# Patient Record
Sex: FEMALE | Race: WHITE | NOT HISPANIC OR LATINO | Employment: FULL TIME | URBAN - METROPOLITAN AREA
[De-identification: names, ages, dates, MRNs, and addresses within clinical notes are randomized per-mention and may not be internally consistent; named-entity substitution may affect disease eponyms.]

---

## 2017-11-29 ENCOUNTER — OFFICE VISIT (OUTPATIENT)
Dept: OBGYN | Facility: CLINIC | Age: 57
End: 2017-11-29
Attending: OBSTETRICS & GYNECOLOGY
Payer: COMMERCIAL

## 2017-11-29 ENCOUNTER — RADIANT APPOINTMENT (OUTPATIENT)
Dept: MAMMOGRAPHY | Facility: CLINIC | Age: 57
End: 2017-11-29
Attending: OBSTETRICS & GYNECOLOGY
Payer: COMMERCIAL

## 2017-11-29 VITALS
HEIGHT: 70 IN | WEIGHT: 197 LBS | BODY MASS INDEX: 28.2 KG/M2 | DIASTOLIC BLOOD PRESSURE: 78 MMHG | SYSTOLIC BLOOD PRESSURE: 124 MMHG

## 2017-11-29 DIAGNOSIS — Z13.6 SCREENING FOR CARDIOVASCULAR CONDITION: ICD-10-CM

## 2017-11-29 DIAGNOSIS — R60.0 LOCALIZED EDEMA: ICD-10-CM

## 2017-11-29 DIAGNOSIS — Z12.31 VISIT FOR SCREENING MAMMOGRAM: ICD-10-CM

## 2017-11-29 DIAGNOSIS — Z01.419 ENCOUNTER FOR GYNECOLOGICAL EXAMINATION WITHOUT ABNORMAL FINDING: Primary | ICD-10-CM

## 2017-11-29 LAB
ALBUMIN SERPL-MCNC: 3.9 G/DL (ref 3.4–5)
ALP SERPL-CCNC: 112 U/L (ref 40–150)
ALT SERPL W P-5'-P-CCNC: 31 U/L (ref 0–50)
ANION GAP SERPL CALCULATED.3IONS-SCNC: 6 MMOL/L (ref 3–14)
AST SERPL W P-5'-P-CCNC: 19 U/L (ref 0–45)
BILIRUB SERPL-MCNC: 0.6 MG/DL (ref 0.2–1.3)
BUN SERPL-MCNC: 13 MG/DL (ref 7–30)
CALCIUM SERPL-MCNC: 9.2 MG/DL (ref 8.5–10.1)
CHLORIDE SERPL-SCNC: 104 MMOL/L (ref 94–109)
CHOLEST SERPL-MCNC: 252 MG/DL
CO2 SERPL-SCNC: 30 MMOL/L (ref 20–32)
CREAT SERPL-MCNC: 0.79 MG/DL (ref 0.52–1.04)
GFR SERPL CREATININE-BSD FRML MDRD: 75 ML/MIN/1.7M2
GLUCOSE SERPL-MCNC: 102 MG/DL (ref 70–99)
HDLC SERPL-MCNC: 99 MG/DL
LDLC SERPL CALC-MCNC: 140 MG/DL
NONHDLC SERPL-MCNC: 153 MG/DL
POTASSIUM SERPL-SCNC: 3.9 MMOL/L (ref 3.4–5.3)
PROT SERPL-MCNC: 7.3 G/DL (ref 6.8–8.8)
SODIUM SERPL-SCNC: 140 MMOL/L (ref 133–144)
TRIGL SERPL-MCNC: 67 MG/DL

## 2017-11-29 PROCEDURE — 80061 LIPID PANEL: CPT | Performed by: OBSTETRICS & GYNECOLOGY

## 2017-11-29 PROCEDURE — 36415 COLL VENOUS BLD VENIPUNCTURE: CPT | Performed by: OBSTETRICS & GYNECOLOGY

## 2017-11-29 PROCEDURE — G0202 SCR MAMMO BI INCL CAD: HCPCS | Mod: TC

## 2017-11-29 PROCEDURE — 99396 PREV VISIT EST AGE 40-64: CPT | Performed by: OBSTETRICS & GYNECOLOGY

## 2017-11-29 PROCEDURE — 80053 COMPREHEN METABOLIC PANEL: CPT | Performed by: OBSTETRICS & GYNECOLOGY

## 2017-11-29 RX ORDER — CHOLECALCIFEROL (VITAMIN D3) 1250 MCG
CAPSULE ORAL
Refills: 0 | COMMUNITY
Start: 2017-11-21

## 2017-11-29 ASSESSMENT — ANXIETY QUESTIONNAIRES
IF YOU CHECKED OFF ANY PROBLEMS ON THIS QUESTIONNAIRE, HOW DIFFICULT HAVE THESE PROBLEMS MADE IT FOR YOU TO DO YOUR WORK, TAKE CARE OF THINGS AT HOME, OR GET ALONG WITH OTHER PEOPLE: NOT DIFFICULT AT ALL
GAD7 TOTAL SCORE: 0
6. BECOMING EASILY ANNOYED OR IRRITABLE: NOT AT ALL
3. WORRYING TOO MUCH ABOUT DIFFERENT THINGS: NOT AT ALL
5. BEING SO RESTLESS THAT IT IS HARD TO SIT STILL: NOT AT ALL
2. NOT BEING ABLE TO STOP OR CONTROL WORRYING: NOT AT ALL
7. FEELING AFRAID AS IF SOMETHING AWFUL MIGHT HAPPEN: NOT AT ALL
1. FEELING NERVOUS, ANXIOUS, OR ON EDGE: NOT AT ALL

## 2017-11-29 ASSESSMENT — PATIENT HEALTH QUESTIONNAIRE - PHQ9
SUM OF ALL RESPONSES TO PHQ QUESTIONS 1-9: 0
5. POOR APPETITE OR OVEREATING: NOT AT ALL

## 2017-11-29 NOTE — PROGRESS NOTES
Nany is a 57 year old  female who presents for annual exam.     Besides routine health maintenance, she has no other health concerns today .    HPI:  The patient's PCP is Beth Ritchie MD.   No concerns  Pap and hpv q 5y, due next yr      GYNECOLOGIC HISTORY:    Patient's last menstrual period was 2016.  Her current contraception method is: vasectomy and menopause.  She  reports that she has never smoked. She has never used smokeless tobacco.      Patient is not sexually active.  STD testing offered?  Declined  Last PHQ-9 score on record =   PHQ-9 SCORE 2017   Total Score 0     Last GAD7 score on record =   TOREY-7 SCORE 2017   Total Score 0     Alcohol Score = 4    HEALTH MAINTENANCE:  Cholesterol:   Cholesterol   Date Value Ref Range Status   08/15/2016 254 (H) <200 mg/dL Final     Comment:     Desirable:       <200 mg/dl   2015 229 mg/dL Final   Last Mammo: 2016, Result: normal, Next Mammo: today  Pap: 2013: WNL, HPV-  Colonoscopy:  , Result: normal, Next Colonoscopy:   Dexa:  Never    Health maintenance updated:  yes    HISTORY:  Obstetric History       T3      L3     SAB0   TAB0   Ectopic0   Multiple0   Live Births3       # Outcome Date GA Lbr Iggy/2nd Weight Sex Delivery Anes PTL Lv   3 Term         JESSICA   2 Term         JESSICA   1 Term         JESSICA          There is no problem list on file for this patient.    Past Surgical History:   Procedure Laterality Date     HERNIA REPAIR        Social History   Substance Use Topics     Smoking status: Never Smoker     Smokeless tobacco: Never Used     Alcohol use Not on file      Problem (# of Occurrences) Relation (Name,Age of Onset)    Coronary Artery Disease (1) Father    DIABETES (1) Brother    Fractures (1) Mother: hip    Hyperlipidemia (2) Mother, Father            Current Outpatient Prescriptions   Medication Sig     vitamin D3 (CHOLECALCIFEROL) 15387 UNITS capsule TK 1 C PO Q OTHER WEEK     BREO ELLIPTA  "100-25 MCG/INH oral inhaler INHALE 1 PUFF D. RINSE MOUTH WELL     DULERA 100-5 MCG/ACT oral inhaler 2 PUFFS BID. USE SPACER AND RINSE MOUTH WELL     fluticasone (FLOVENT HFA) 110 MCG/ACT inhaler Inhale 2 puffs into the lungs 2 times daily     fluticasone (FLONASE) 50 MCG/ACT nasal spray Spray 2 sprays into both nostrils daily     No current facility-administered medications for this visit.      No Known Allergies    Past medical, surgical, social and family histories were reviewed and updated in EPIC.    ROS:   12 point review of systems negative other than symptoms noted below.    EXAM:  /78  Ht 5' 9.5\" (1.765 m)  Wt 197 lb (89.4 kg)  LMP 06/23/2016  Breastfeeding? No  BMI 28.67 kg/m2   BMI: Body mass index is 28.67 kg/(m^2).    PHYSICAL EXAM:  Constitutional:  Appearance: Well nourished, well developed, alert, in no acute distress  Neck:  Lymph Nodes:  No lymphadenopathy present    Thyroid:  Gland size normal, nontender, no nodules or masses present  on palpation  Chest:  Respiratory Effort:  Breathing unlabored  Cardiovascular:    Heart: Auscultation:  Regular rate, normal rhythm, no murmurs present  Breasts: Inspection of Breasts:  No lymphadenopathy present., Palpation of Breasts and Axillae:  No masses present on palpation, no breast tenderness., Axillary Lymph Nodes:  No lymphadenopathy present. and No nodularity, asymmetry or nipple discharge bilaterally.  Gastrointestinal:   Abdominal Examination:  Abdomen nontender to palpation, tone normal without rigidity or guarding, no masses present, umbilicus without lesions   Liver and Spleen:  No hepatomegaly present, liver nontender to palpation    Hernias:  No hernias present  Lymphatic: Lymph Nodes:  No other lymphadenopathy present  Skin:  General Inspection:  No rashes present, no lesions present, no areas of  discoloration    Genitalia and Groin:  No rashes present, no lesions present, no areas of  discoloration, no masses " present  Neurologic/Psychiatric:    Mental Status:  Oriented X3     Pelvic Exam:  External Genitalia:     Normal appearance for age, no discharge present, no tenderness present, no inflammatory lesions present, color normal  Vagina:     Normal vaginal vault without central or paravaginal defects, no discharge present, no inflammatory lesions present, no masses present  Bladder:     Nontender to palpation  Urethra:   Urethral Body:  Urethra palpation normal, urethra structural support normal   Urethral Meatus:  No erythema or lesions present  Cervix:     Appearance healthy, no lesions present, nontender to palpation, no bleeding present  Uterus:     Uterus: firm, normal sized and nontender, anteverted in position.   Adnexa:     No adnexal tenderness present, no adnexal masses present  Perineum:     Perineum within normal limits, no evidence of trauma, no rashes or skin lesions present  Anus:     Anus within normal limits, no hemorrhoids present  Inguinal Lymph Nodes:     No lymphadenopathy present  Pubic Hair:     Normal pubic hair distribution for age  Genitalia and Groin:     No rashes present, no lesions present, no areas of discoloration, no masses present      COUNSELING:   Reviewed preventive health counseling, as reflected in patient instructions       Regular exercise       Healthy diet/nutrition    BMI: Body mass index is 28.67 kg/(m^2).  Weight management plan: Patient was referred to their PCP to discuss a diet and exercise plan.    ASSESSMENT:  57 year old female with satisfactory annual exam.    ICD-10-CM    1. Encounter for gynecological examination without abnormal finding Z01.419    2. Localized edema R60.0 Comprehensive metabolic panel   3. Screening for cardiovascular condition Z13.6 Lipid Profile (Chol, Trig, HDL, LDL calc)       PLAN:  Patient wants fasting cmp and lipids  Has lower extremity edema and wants fasting labs for this, saw primary for it    Mary Jo Calvillo MD

## 2017-11-29 NOTE — MR AVS SNAPSHOT
"              After Visit Summary   11/29/2017    Nany Pascual    MRN: [id number]           Patient Information     Date Of Birth          1960        Visit Information        Provider Department      11/29/2017 9:10 AM Mary Jo Calvillo MD [facility]        Today's Diagnoses     Encounter for gynecological examination without abnormal finding    -  1    Localized edema        Screening for cardiovascular condition           Follow-ups after your visit        Your next 10 appointments already scheduled     Nov 29, 2017  9:30 AM CST   MA SCREENING DIGITAL BILATERAL with WEMA1   [facility] ([facility])    [address] [place] [address], [address]  [facility] [id number] [phone]           Do not use any powder, lotion or deodorant under your arms or on your breast. If you do, we will ask you to remove it before your exam.  Wear comfortable, two-piece clothing.  If you have any allergies, tell your care team.  Bring any previous mammograms from other facilities or have them mailed to the breast center. Three-dimensional (3D) mammograms are available at [place] locations in [facility], [facility], [facility], and [place]. [facility] locations include [place] and [facility] in [place]. Benefits of 3D mammograms include: - Improved rate of cancer detection - Decreases your chance of having to go back for more tests, which means fewer: - \"False-positive\" results (This means that there is an abnormal area but it isn't cancer.) - Invasive testing procedures, such as a biopsy or surgery - Can provide clearer images of the breast if you have dense breast tissue. 3D mammography is an optional exam that anyone can have with a 2D mammogram. It doesn't replace or take the place of a 2D mammogram. 2D mammograms remain an effective screening test for all women.  Not all insurance companies cover the cost of a " "3D mammogram. Check with your insurance.              Who to contact     If you have questions or need follow up information about today's clinic visit or your schedule please contact [facility] directly at [phone].  Normal or non-critical lab and imaging results will be communicated to you by MyChart, letter or phone within 4 business days after the clinic has received the results. If you do not hear from us within 7 days, please contact the clinic through MyChart or phone. If you have a critical or abnormal lab result, we will notify you by phone as soon as possible.  Submit refill requests through [employer] or call your pharmacy and they will forward the refill request to us. Please allow 3 business days for your refill to be completed.          Additional Information About Your Visit        [employer] Information     [employer] gives you secure access to your electronic health record. If you see a primary care provider, you can also send messages to your care team and make appointments. If you have questions, please call your primary care clinic.  If you do not have a primary care provider, please call [phone] and they will assist you.        Care EveryWhere ID     This is your Care EveryWhere ID. This could be used by other organizations to access your [facility] medical records  [id number]        Your Vitals Were     Height Last Period Breastfeeding? BMI (Body Mass Index)          5' 9.5\" (1.765 m) 06/23/2016 No 28.67 kg/m2         Blood Pressure from Last 3 Encounters:   11/29/17 124/78   08/15/16 112/74   07/07/15 110/80    Weight from Last 3 Encounters:   11/29/17 197 lb (89.4 kg)   08/15/16 198 lb (89.8 kg)   07/07/15 182 lb (82.6 kg)              We Performed the Following     Comprehensive metabolic panel     Lipid Profile (Chol, Trig, HDL, LDL calc)        Primary Care Provider Office Phone # Fax #    Beth Ritchie [phone]       [facility] [address] " [place] [address]        Equal Access to Services     [facility] MANJINDER : Hadii rachelle lieberman naliniangeles Soevieali, waaxda luqadaha, qaybta amymajodi lyn, cynthia chiangtrellkei granados. So [facility] [phone].    ATENCIÓN: Si habla español, tiene a ryan disposición servicios gratuitos de asistencia lingüística. [facility] [phone].    We comply with applicable federal civil rights laws and [place] laws. We do not discriminate on the basis of race, color, national origin, age, disability, sex, sexual orientation, or gender identity.            Thank you!     Thank you for choosing [facility]  for your care. Our goal is always to provide you with excellent care. Hearing back from our patients is one way we can continue to improve our services. Please take a few minutes to complete the written survey that you may receive in the mail after your visit with us. Thank you!             Your Updated Medication List - Protect others around you: Learn how to safely use, store and throw away your medicines at www.disposemymeds.org.          This list is accurate as of: 11/29/17  9:27 AM.  Always use your most recent med list.                   Brand Name Dispense Instructions for use Diagnosis    BREO ELLIPTA 100-25 MCG/INH oral inhaler   Generic drug:  fluticasone-vilanterol      INHALE 1 PUFF D. RINSE MOUTH WELL        DULERA 100-5 MCG/ACT oral inhaler   Generic drug:  mometasone-formoterol      2 PUFFS BID. USE SPACER AND RINSE MOUTH WELL        FLOVENT  MCG/ACT Inhaler   Generic drug:  fluticasone     1 Inhaler    Inhale 2 puffs into the lungs 2 times daily        fluticasone 50 MCG/ACT spray    FLONASE    1 Bottle    Spray 2 sprays into both nostrils daily        vitamin D3 23569 UNITS capsule    CHOLECALCIFEROL     TK 1 C PO Q OTHER WEEK

## 2017-11-30 ASSESSMENT — ANXIETY QUESTIONNAIRES: GAD7 TOTAL SCORE: 0

## 2018-04-14 ENCOUNTER — HEALTH MAINTENANCE LETTER (OUTPATIENT)
Age: 58
End: 2018-04-14

## 2018-12-18 NOTE — PROGRESS NOTES
Nany is a 58 year old  female who presents for annual exam.     Besides routine health maintenance, she has no other health concerns today .    HPI:  The patient's PCP is Beth Ritchie MD.    No concerns   Autistic son  Fasting labs today  Takes Vit D      GYNECOLOGIC HISTORY:    Patient's last menstrual period was 2016.  Her current contraception method is: menopause.  She  reports that  has never smoked. she has never used smokeless tobacco.    Patient is sexually active.  STD testing offered?  Declined  Last PHQ-9 score on record =   PHQ-9 SCORE 2018   PHQ-9 Total Score 0     Last GAD7 score on record =   TOREY-7 SCORE 2018   Total Score 0     Alcohol Score = 2    HEALTH MAINTENANCE:  Cholesterol: 17 Total= 252, Triglycerides=67, HDL=99, HKD=403    Cholesterol   Date Value Ref Range Status   2017 252 (H) <200 mg/dL Final     Comment:     Desirable:       <200 mg/dl   08/15/2016 254 (H) <200 mg/dL Final     Comment:     Desirable:       <200 mg/dl      Last Mammo: one year ago, Result: normal, Next Mammo: today   Pap:13 WNIL HPV NEG   Colonoscopy:  3/13/13, Result: normal, Next Colonoscopy: .  Dexa:   NA    Health maintenance updated:  yes    HISTORY:  Obstetric History       T3      L3     SAB0   TAB0   Ectopic0   Multiple0   Live Births3       # Outcome Date GA Lbr Iggy/2nd Weight Sex Delivery Anes PTL Lv   3 Term         JESSICA   2 Term         JESSICA   1 Term         JESSICA          There is no problem list on file for this patient.    Past Surgical History:   Procedure Laterality Date     HERNIA REPAIR        Social History     Tobacco Use     Smoking status: Never Smoker     Smokeless tobacco: Never Used   Substance Use Topics     Alcohol use: Yes     Alcohol/week: 0.0 oz     Frequency: 2-3 times a week     Drinks per session: 1 or 2     Binge frequency: Never      Problem (# of Occurrences) Relation (Name,Age of Onset)    Coronary Artery Disease (1) Father  "   Diabetes (1) Brother    Fractures (1) Mother: hip    Hyperlipidemia (2) Mother, Father            Current Outpatient Medications   Medication Sig     amoxicillin (AMOXIL) 500 MG tablet TK 2 TS PO BID FOR 10 DAYS WITH URI     ASMANEX 30 METERED DOSES 220 MCG/INH inhaler INHALE 1 PUFF PO BID FOR 2 WEEKS AT ONSET OF URI. RINSE AFTER USE     BREO ELLIPTA 100-25 MCG/INH oral inhaler INHALE 1 PUFF D. RINSE MOUTH WELL     DULERA 100-5 MCG/ACT oral inhaler 2 PUFFS BID. USE SPACER AND RINSE MOUTH WELL     fluticasone (FLONASE) 50 MCG/ACT nasal spray Spray 2 sprays into both nostrils daily     fluticasone (FLOVENT HFA) 110 MCG/ACT inhaler Inhale 2 puffs into the lungs 2 times daily     FLUZONE QUADRIVALENT 0.5 ML injection ADM 0.5ML IM UTD     vitamin D3 (CHOLECALCIFEROL) 84331 UNITS capsule TK 1 C PO Q OTHER WEEK     No current facility-administered medications for this visit.      No Known Allergies    Past medical, surgical, social and family histories were reviewed and updated in EPIC.    ROS:   12 point review of systems negative other than symptoms noted below.  Skin: Skin Dryness    EXAM:  /64   Pulse 64   Ht 1.753 m (5' 9\")   Wt 91.2 kg (201 lb)   LMP 06/23/2016   BMI 29.68 kg/m     BMI: Body mass index is 29.68 kg/m .    PHYSICAL EXAM:  Constitutional:  Appearance: Well nourished, well developed, alert, in no acute distress  Neck:  Lymph Nodes:  No lymphadenopathy present    Thyroid:  Gland size normal, nontender, no nodules or masses present  on palpation  Chest:  Respiratory Effort:  Breathing unlabored  Cardiovascular:    Heart: Auscultation:  Regular rate, normal rhythm, no murmurs present  Breasts: Inspection of Breasts:  No lymphadenopathy present., Palpation of Breasts and Axillae:  No masses present on palpation, no breast tenderness., Axillary Lymph Nodes:  No lymphadenopathy present. and No nodularity, asymmetry or nipple discharge bilaterally.  Gastrointestinal:   Abdominal Examination:  " Abdomen nontender to palpation, tone normal without rigidity or guarding, no masses present, umbilicus without lesions   Liver and Spleen:  No hepatomegaly present, liver nontender to palpation    Hernias:  No hernias present  Lymphatic: Lymph Nodes:  No other lymphadenopathy present  Skin:  General Inspection:  No rashes present, no lesions present, no areas of  discoloration    Genitalia and Groin:  No rashes present, no lesions present, no areas of  discoloration, no masses present  Neurologic/Psychiatric:    Mental Status:  Oriented X3     Pelvic Exam:  External Genitalia:     Normal appearance for age, no discharge present, no tenderness present, no inflammatory lesions present, color normal  Vagina:     Normal vaginal vault without central or paravaginal defects, no discharge present, no inflammatory lesions present, no masses present  Bladder:     Nontender to palpation  Urethra:   Urethral Body:  Urethra palpation normal, urethra structural support normal   Urethral Meatus:  No erythema or lesions present  Cervix:     Appearance healthy, no lesions present, nontender to palpation, no bleeding present  Uterus:     Uterus: firm, normal sized and nontender, midplane in position.   Adnexa:     No adnexal tenderness present, no adnexal masses present  Perineum:     Perineum within normal limits, no evidence of trauma, no rashes or skin lesions present  Anus:     Anus within normal limits, no hemorrhoids present  Inguinal Lymph Nodes:     No lymphadenopathy present  Pubic Hair:     Normal pubic hair distribution for age  Genitalia and Groin:     No rashes present, no lesions present, no areas of discoloration, no masses present      COUNSELING:   Reviewed preventive health counseling, as reflected in patient instructions       Regular exercise       Healthy diet/nutrition    BMI: Body mass index is 29.68 kg/m .  Weight management plan: work on diet and exercise    ASSESSMENT:  58 year old female with satisfactory  annual exam.    ICD-10-CM    1. Encounter for gynecological examination without abnormal finding Z01.419 Pap imaged thin layer screen with HPV - recommended age 30 - 65     HPV High Risk Types DNA Cervical       PLAN:  Pap and hpv done  mammo  Fasting labs  Return 1 yr    Mary Jo Calvillo MD

## 2018-12-19 ENCOUNTER — RADIANT APPOINTMENT (OUTPATIENT)
Dept: MAMMOGRAPHY | Facility: CLINIC | Age: 58
End: 2018-12-19
Payer: COMMERCIAL

## 2018-12-19 ENCOUNTER — OFFICE VISIT (OUTPATIENT)
Dept: OBGYN | Facility: CLINIC | Age: 58
End: 2018-12-19
Payer: COMMERCIAL

## 2018-12-19 VITALS
WEIGHT: 201 LBS | HEIGHT: 69 IN | SYSTOLIC BLOOD PRESSURE: 118 MMHG | BODY MASS INDEX: 29.77 KG/M2 | DIASTOLIC BLOOD PRESSURE: 64 MMHG | HEART RATE: 64 BPM

## 2018-12-19 DIAGNOSIS — Z01.419 ENCOUNTER FOR GYNECOLOGICAL EXAMINATION WITHOUT ABNORMAL FINDING: Primary | ICD-10-CM

## 2018-12-19 DIAGNOSIS — Z12.31 VISIT FOR SCREENING MAMMOGRAM: ICD-10-CM

## 2018-12-19 DIAGNOSIS — Z13.228 SCREENING FOR METABOLIC DISORDER: ICD-10-CM

## 2018-12-19 DIAGNOSIS — Z13.6 ENCOUNTER FOR LIPID SCREENING FOR CARDIOVASCULAR DISEASE: ICD-10-CM

## 2018-12-19 DIAGNOSIS — Z13.220 ENCOUNTER FOR LIPID SCREENING FOR CARDIOVASCULAR DISEASE: ICD-10-CM

## 2018-12-19 PROCEDURE — 87624 HPV HI-RISK TYP POOLED RSLT: CPT | Performed by: OBSTETRICS & GYNECOLOGY

## 2018-12-19 PROCEDURE — 99396 PREV VISIT EST AGE 40-64: CPT | Performed by: OBSTETRICS & GYNECOLOGY

## 2018-12-19 PROCEDURE — 80061 LIPID PANEL: CPT | Performed by: OBSTETRICS & GYNECOLOGY

## 2018-12-19 PROCEDURE — 36415 COLL VENOUS BLD VENIPUNCTURE: CPT | Performed by: OBSTETRICS & GYNECOLOGY

## 2018-12-19 PROCEDURE — 80053 COMPREHEN METABOLIC PANEL: CPT | Performed by: OBSTETRICS & GYNECOLOGY

## 2018-12-19 PROCEDURE — G0145 SCR C/V CYTO,THINLAYER,RESCR: HCPCS | Performed by: OBSTETRICS & GYNECOLOGY

## 2018-12-19 PROCEDURE — 77067 SCR MAMMO BI INCL CAD: CPT | Mod: TC

## 2018-12-19 RX ORDER — AMOXICILLIN 500 MG/1
TABLET, FILM COATED ORAL
Refills: 2 | COMMUNITY
Start: 2018-08-30 | End: 2023-05-11

## 2018-12-19 RX ORDER — INFLUENZA A VIRUS A/GUANGDONG-MAONAN/SWL1536/2019 CNIC-1909 (H1N1) ANTIGEN (FORMALDEHYDE INACTIVATED), INFLUENZA A VIRUS A/HONG KONG/2671/2019 (H3N2) ANTIGEN (FORMALDEHYDE INACTIVATED), INFLUENZA B VIRUS B/PHUKET/3073/2013 ANTIGEN (FORMALDEHYDE INACTIVATED), AND INFLUENZA B VIRUS B/WASHINGTON/02/2019 ANTIGEN (FORMALDEHYDE INACTIVATED) 15; 15; 15; 15 UG/.5ML; UG/.5ML; UG/.5ML; UG/.5ML
INJECTION, SUSPENSION INTRAMUSCULAR
Refills: 0 | COMMUNITY
Start: 2018-10-02 | End: 2019-12-17

## 2018-12-19 SDOH — HEALTH STABILITY: MENTAL HEALTH: HOW OFTEN DO YOU HAVE 6 OR MORE DRINKS ON ONE OCCASION?: NEVER

## 2018-12-19 SDOH — HEALTH STABILITY: MENTAL HEALTH: HOW MANY STANDARD DRINKS CONTAINING ALCOHOL DO YOU HAVE ON A TYPICAL DAY?: 1 OR 2

## 2018-12-19 SDOH — HEALTH STABILITY: MENTAL HEALTH: HOW OFTEN DO YOU HAVE A DRINK CONTAINING ALCOHOL?: 2-3 TIMES A WEEK

## 2018-12-19 ASSESSMENT — PATIENT HEALTH QUESTIONNAIRE - PHQ9
5. POOR APPETITE OR OVEREATING: NOT AT ALL
SUM OF ALL RESPONSES TO PHQ QUESTIONS 1-9: 0

## 2018-12-19 ASSESSMENT — ANXIETY QUESTIONNAIRES
1. FEELING NERVOUS, ANXIOUS, OR ON EDGE: NOT AT ALL
5. BEING SO RESTLESS THAT IT IS HARD TO SIT STILL: NOT AT ALL
GAD7 TOTAL SCORE: 0
6. BECOMING EASILY ANNOYED OR IRRITABLE: NOT AT ALL
IF YOU CHECKED OFF ANY PROBLEMS ON THIS QUESTIONNAIRE, HOW DIFFICULT HAVE THESE PROBLEMS MADE IT FOR YOU TO DO YOUR WORK, TAKE CARE OF THINGS AT HOME, OR GET ALONG WITH OTHER PEOPLE: NOT DIFFICULT AT ALL
2. NOT BEING ABLE TO STOP OR CONTROL WORRYING: NOT AT ALL
7. FEELING AFRAID AS IF SOMETHING AWFUL MIGHT HAPPEN: NOT AT ALL
3. WORRYING TOO MUCH ABOUT DIFFERENT THINGS: NOT AT ALL

## 2018-12-19 ASSESSMENT — MIFFLIN-ST. JEOR: SCORE: 1561.11

## 2018-12-20 LAB
ALBUMIN SERPL-MCNC: 3.8 G/DL (ref 3.4–5)
ALP SERPL-CCNC: 105 U/L (ref 40–150)
ALT SERPL W P-5'-P-CCNC: 25 U/L (ref 0–50)
ANION GAP SERPL CALCULATED.3IONS-SCNC: 5 MMOL/L (ref 3–14)
AST SERPL W P-5'-P-CCNC: 14 U/L (ref 0–45)
BILIRUB SERPL-MCNC: 0.6 MG/DL (ref 0.2–1.3)
BUN SERPL-MCNC: 13 MG/DL (ref 7–30)
CALCIUM SERPL-MCNC: 9.5 MG/DL (ref 8.5–10.1)
CHLORIDE SERPL-SCNC: 104 MMOL/L (ref 94–109)
CHOLEST SERPL-MCNC: 273 MG/DL
CO2 SERPL-SCNC: 30 MMOL/L (ref 20–32)
CREAT SERPL-MCNC: 0.74 MG/DL (ref 0.52–1.04)
GFR SERPL CREATININE-BSD FRML MDRD: 89 ML/MIN/{1.73_M2}
GLUCOSE SERPL-MCNC: 93 MG/DL (ref 70–99)
HDLC SERPL-MCNC: 91 MG/DL
LDLC SERPL CALC-MCNC: 169 MG/DL
NONHDLC SERPL-MCNC: 182 MG/DL
POTASSIUM SERPL-SCNC: 4 MMOL/L (ref 3.4–5.3)
PROT SERPL-MCNC: 7.2 G/DL (ref 6.8–8.8)
SODIUM SERPL-SCNC: 139 MMOL/L (ref 133–144)
TRIGL SERPL-MCNC: 66 MG/DL

## 2018-12-20 ASSESSMENT — ANXIETY QUESTIONNAIRES: GAD7 TOTAL SCORE: 0

## 2018-12-24 LAB
COPATH REPORT: NORMAL
PAP: NORMAL

## 2018-12-26 ENCOUNTER — TELEPHONE (OUTPATIENT)
Dept: OBGYN | Facility: CLINIC | Age: 58
End: 2018-12-26

## 2018-12-26 NOTE — TELEPHONE ENCOUNTER
Notes recorded by Mary Jo Calvillo MD on 12/26/2018 at 12:51 PM CST  Lipids are high  Needs to f/u with a primary care provider   Comprehensive metabolic panel   Order: [id number]   Status:  Final result   Visible to patient:  Yes (Jose Cruz) Dx:  Screening for metabolic disorder    Ref Range & Units 7d ago 1yr ago 2yr ago   Sodium 133 - 144 mmol/L 139  140  139    Potassium 3.4 - 5.3 mmol/L 4.0  3.9  4.1    Chloride 94 - 109 mmol/L 104  104  105    Carbon Dioxide 20 - 32 mmol/L 30  30  29    Anion Gap 3 - 14 mmol/L 5  6  5    Glucose 70 - 99 mg/dL 93  102 Abnormally high  CM 97    Urea Nitrogen 7 - 30 mg/dL 13  13  20    Creatinine 0.52 - 1.04 mg/dL 0.74  0.79  0.67    GFR Estimate >60 mL/min/ 89  75 R, CM >90   Non  GFR Calc  R      Comment: Non  GFR Calc   Starting 12/18/2018, serum creatinine based estimated GFR (eGFR) will be   calculated using the Chronic Kidney Disease Epidemiology Collaboration   (CKD-EPI) equation.    GFR Estimate If Black >60 mL/min/ >90  >90 R, CM >90    GFR Calc  R      Comment:  GFR Calc   Starting 12/18/2018, serum creatinine based estimated GFR (eGFR) will be   calculated using the Chronic Kidney Disease Epidemiology Collaboration   (CKD-EPI) equation.    Calcium 8.5 - 10.1 mg/dL 9.5  9.2  8.9    Bilirubin Total 0.2 - 1.3 mg/dL 0.6  0.6  0.4    Albumin 3.4 - 5.0 g/dL 3.8  3.9  3.8    Protein Total 6.8 - 8.8 g/dL 7.2  7.3  7.1    Alkaline Phosphatase 40 - 150 U/L 105  112  103    ALT 0 - 50 U/L 25  31  23    AST 0 - 45 U/L 14  19  12    [place]  [facility] [place]         Specimen Collected: 12/19/18 10:47 AM Last Resulted: 12/20/18  9:06 AM         Lab Flowsheet       Order Details       View Encounter       Lab and Collection Details       Routing       Result History         CM=Additional comments  R=Reference range differs  from displayed range             Left msg for pt regarding lab results and Dr. Calvillo's recommendations to f/u with a PCP. Recommended [facility] down the [place] if pt does not already have a PCP.  Pari Marin RN on 12/26/2018 at 4:38 PM

## 2018-12-27 LAB
FINAL DIAGNOSIS: NORMAL
HPV HR 12 DNA CVX QL NAA+PROBE: NEGATIVE
HPV16 DNA SPEC QL NAA+PROBE: NEGATIVE
HPV18 DNA SPEC QL NAA+PROBE: NEGATIVE
SPECIMEN DESCRIPTION: NORMAL
SPECIMEN SOURCE CVX/VAG CYTO: NORMAL

## 2019-12-12 NOTE — PROGRESS NOTES
Nany is a 59 year old  female who presents for annual exam.     Besides routine health maintenance, she has no other health concerns today .    HPI:  The patient's PCP is Beth Ritchie MD.    Patient has a pcp at this point  Non smoker  Gets her labs with pcp    No vaginal bleeding      GYNECOLOGIC HISTORY:    Patient's last menstrual period was 2016.      Her current contraception method is: not sexually active.  She  reports that she has never smoked. She has never used smokeless tobacco.    Patient is not sexually active.  STD testing offered?  Declined  Last PHQ-9 score on record =   PHQ-9 SCORE 2018   PHQ-9 Total Score 0     Last GAD7 score on record =   TOREY-7 SCORE 2018   Total Score 0     Alcohol Score = 2    HEALTH MAINTENANCE:  Cholesterol:   Cholesterol   Date Value Ref Range Status   2018 273 (H) <200 mg/dL Final     Comment:     Desirable:       <200 mg/dl   2017 252 (H) <200 mg/dL Final     Comment:     Desirable:       <200 mg/dl      Recent Labs   Lab Test 18  1047 17  0933   CHOL 273* 252*   HDL 91 99   * 140*   TRIG 66 67     Last Mammo: One year ago, Result: Normal, Next Mammo: Today   Pap:   Lab Results   Component Value Date    PAP NIL 2018 WNL HPV (-)neg  Colonoscopy:  3/2013, Result: Normal, Next Colonoscopy: 4 years.  Dexa:  Never    Health maintenance updated:  yes    HISTORY:  OB History    Para Term  AB Living   3 3 3 0 0 3   SAB TAB Ectopic Multiple Live Births   0 0 0 0 3      # Outcome Date GA Lbr Iggy/2nd Weight Sex Delivery Anes PTL Lv   3 Term         JESSICA   2 Term         JESSICA   1 Term         JESSICA       There is no problem list on file for this patient.    Past Surgical History:   Procedure Laterality Date     HERNIA REPAIR        Social History     Tobacco Use     Smoking status: Never Smoker     Smokeless tobacco: Never Used   Substance Use Topics     Alcohol use: Yes      "Alcohol/week: 0.0 standard drinks     Frequency: 2-3 times a week     Drinks per session: 1 or 2     Binge frequency: Never      Problem (# of Occurrences) Relation (Name,Age of Onset)    Coronary Artery Disease (1) Father    Diabetes (1) Brother    Fractures (1) Mother: hip    Hyperlipidemia (2) Mother, Father    No Known Problems (5) Sister, Maternal Grandmother, Maternal Grandfather, Paternal Grandmother, Other            Current Outpatient Medications   Medication Sig     amoxicillin (AMOXIL) 500 MG tablet TK 2 TS PO BID FOR 10 DAYS WITH URI     BREO ELLIPTA 100-25 MCG/INH oral inhaler INHALE 1 PUFF D. RINSE MOUTH WELL     DULERA 100-5 MCG/ACT oral inhaler 2 PUFFS BID. USE SPACER AND RINSE MOUTH WELL     fexofenadine (ALLEGRA) 180 MG tablet Take 180 mg by mouth daily     fluticasone (FLONASE) 50 MCG/ACT nasal spray Spray 2 sprays into both nostrils daily     fluticasone (FLOVENT HFA) 110 MCG/ACT inhaler Inhale 2 puffs into the lungs 2 times daily     Naproxen (NAPROSYN PO)      vitamin D3 (CHOLECALCIFEROL) 92718 UNITS capsule TK 1 C PO Q OTHER WEEK     No current facility-administered medications for this visit.      No Known Allergies    Past medical, surgical, social and family histories were reviewed and updated in EPIC.    ROS:   12 point review of systems negative other than symptoms noted below or in the HPI.  No urinary frequency or dysuria, bladder or kidney problems    EXAM:  /72   Ht 1.765 m (5' 9.5\")   Wt 92.5 kg (204 lb)   LMP 06/23/2016   Breastfeeding No   BMI 29.69 kg/m     BMI: Body mass index is 29.69 kg/m .    PHYSICAL EXAM:  Constitutional:   Appearance: Well nourished, well developed, alert, in no acute distress  Neck:  Lymph Nodes:  No lymphadenopathy present    Thyroid:  Gland size normal, nontender, no nodules or masses present  on palpation  Chest:  Respiratory Effort:  Breathing unlabored    Breasts: Inspection of Breasts:  No lymphadenopathy present., Palpation of Breasts and " Axillae:  No masses present on palpation, no breast tenderness., Axillary Lymph Nodes:  No lymphadenopathy present. and No nodularity, asymmetry or nipple discharge bilaterally.  Gastrointestinal:   Abdominal Examination:  Abdomen nontender to palpation, tone normal without rigidity or guarding, no masses present, umbilicus without lesions   Liver and Spleen:  No hepatomegaly present, liver nontender to palpation    Hernias:  No hernias present  Lymphatic: Lymph Nodes:  No other lymphadenopathy present  Skin:  General Inspection:  No rashes present, no lesions present, no areas of  discoloration  Neurologic:    Mental Status:  Oriented X3.  Normal strength and tone, sensory exam                grossly normal, mentation intact and speech normal.    Psychiatric:   Mentation appears normal and affect normal/bright.         Pelvic Exam:  External Genitalia:     Normal appearance for age, no discharge present, no tenderness present, no inflammatory lesions present, color normal  Vagina:     Normal vaginal vault without central or paravaginal defects, ATROPHIC  Bladder:     Nontender to palpation  Urethra:   Urethral Body:  Urethra palpation normal, urethra structural support normal   Urethral Meatus:  No erythema or lesions present  Cervix:     Appearance healthy, no lesions present, nontender to palpation, no bleeding present  Uterus:     Nontender to palpation, no masses present, position anteflexed, mobility: normal  Adnexa:     No adnexal tenderness present, no adnexal masses present  Perineum:     Perineum within normal limits, no evidence of trauma, no rashes or skin lesions present  Inguinal Lymph Nodes:     No lymphadenopathy present      COUNSELING:   Reviewed preventive health counseling, as reflected in patient instructions       Regular exercise       Healthy diet/nutrition    BMI: Body mass index is 29.69 kg/m .      ASSESSMENT:  59 year old female with satisfactory annual exam.    ICD-10-CM    1. Encounter  for gynecological examination without abnormal finding Z01.419        PLAN:  Mammogram today  Not due for pap this years  Fasting labs with her pcp      Mary Jo Calvillo MD

## 2019-12-17 ENCOUNTER — OFFICE VISIT (OUTPATIENT)
Dept: OBGYN | Facility: CLINIC | Age: 59
End: 2019-12-17
Payer: COMMERCIAL

## 2019-12-17 ENCOUNTER — ANCILLARY PROCEDURE (OUTPATIENT)
Dept: MAMMOGRAPHY | Facility: CLINIC | Age: 59
End: 2019-12-17
Payer: COMMERCIAL

## 2019-12-17 VITALS
WEIGHT: 204 LBS | DIASTOLIC BLOOD PRESSURE: 72 MMHG | HEIGHT: 70 IN | SYSTOLIC BLOOD PRESSURE: 124 MMHG | BODY MASS INDEX: 29.2 KG/M2

## 2019-12-17 DIAGNOSIS — Z12.31 VISIT FOR SCREENING MAMMOGRAM: ICD-10-CM

## 2019-12-17 DIAGNOSIS — Z01.419 ENCOUNTER FOR GYNECOLOGICAL EXAMINATION WITHOUT ABNORMAL FINDING: Primary | ICD-10-CM

## 2019-12-17 PROCEDURE — 77067 SCR MAMMO BI INCL CAD: CPT | Mod: TC

## 2019-12-17 PROCEDURE — 99396 PREV VISIT EST AGE 40-64: CPT | Performed by: OBSTETRICS & GYNECOLOGY

## 2019-12-17 RX ORDER — FEXOFENADINE HCL 180 MG/1
180 TABLET ORAL DAILY
COMMUNITY

## 2019-12-17 ASSESSMENT — MIFFLIN-ST. JEOR: SCORE: 1577.65

## 2022-01-28 NOTE — PROGRESS NOTES
Nany is a 62 year old  female who presents for annual exam.     Besides routine health maintenance, she has no other health concerns today. Has stress test coming up this week.     HPI:    Patient follow for a annual.  Not due for a pap at this time.  Has a pcp.  Her TSH was elevated in November.       The patient's PCP is Josie Sherwood NP.       GYNECOLOGIC HISTORY:    Patient's last menstrual period was 2016.  She  reports that she has never smoked. She has never used smokeless tobacco.  Patient is not sexually active.  STD testing offered?  Declined     Last PHQ-9 score on record =   PHQ-9 SCORE 2022   PHQ-9 Total Score 1     Last GAD7 score on record =   TOREY-7 SCORE 2022   Total Score 0     Alcohol Score = 3    HEALTH MAINTENANCE:  Cholesterol: followed by primary care provider, found in Care Everywhere 21  Last Mammo: 19, Result: Normal, Next Mammo: Today   Pap:   Lab Results   Component Value Date    PAP NIL, NEG-HPV 2018   Colonoscopy:  N/A, Result: not applicable, Next Colonoscopy: screen age 45-50  Dexa:  N/A  Health maintenance updated:  yes    HISTORY:  OB History    Para Term  AB Living   3 3 3 0 0 3   SAB IAB Ectopic Multiple Live Births   0 0 0 0 3      # Outcome Date GA Lbr Iggy/2nd Weight Sex Delivery Anes PTL Lv   3 Term         JESSICA   2 Term         JESSICA   1 Term         JESSICA       There is no problem list on file for this patient.    Past Surgical History:   Procedure Laterality Date     HERNIA REPAIR        Social History     Tobacco Use     Smoking status: Never Smoker     Smokeless tobacco: Never Used   Substance Use Topics     Alcohol use: Yes     Alcohol/week: 0.0 standard drinks      Problem (# of Occurrences) Relation (Name,Age of Onset)    Coronary Artery Disease (1) Father    Diabetes (1) Brother    Fractures (1) Mother: hip    Hyperlipidemia (2) Mother, Father    No Known Problems (5) Sister, Maternal Grandmother, Maternal  Grandfather, Paternal Grandmother, Other            Current Outpatient Medications   Medication Sig     amoxicillin (AMOXIL) 500 MG tablet TK 2 TS PO BID FOR 10 DAYS WITH URI     BREO ELLIPTA 100-25 MCG/INH oral inhaler INHALE 1 PUFF D. RINSE MOUTH WELL     DULERA 100-5 MCG/ACT oral inhaler 2 PUFFS BID. USE SPACER AND RINSE MOUTH WELL     fexofenadine (ALLEGRA) 180 MG tablet Take 180 mg by mouth daily     fluticasone (FLONASE) 50 MCG/ACT nasal spray Spray 2 sprays into both nostrils daily     fluticasone (FLOVENT HFA) 110 MCG/ACT inhaler Inhale 2 puffs into the lungs 2 times daily     Naproxen (NAPROSYN PO)      vitamin D3 (CHOLECALCIFEROL) 64728 UNITS capsule TK 1 C PO Q OTHER WEEK     No current facility-administered medications for this visit.     No Known Allergies    Past medical, surgical, social and family histories were reviewed and updated in EPIC.    ROS:   12 point review of systems negative other than symptoms noted below or in the HPI.  No urinary frequency or dysuria, bladder or kidney problems    EXAM:  [facility] 06/23/2016    BMI: There is no height or weight on file to calculate BMI.    PHYSICAL EXAM:  Constitutional:   Appearance: Well nourished, well developed, alert, in no acute distress    Chest:  Respiratory Effort:  Breathing unlabored  Cardiovascular:    Heart: Auscultation:  Regular rate, normal rhythm, no murmurs present  Breasts: Deferred.  Gastrointestinal:   Abdominal Examination:  Abdomen nontender to palpation, tone normal without rigidity or guarding, no masses present, umbilicus without lesions   Liver and Spleen:  No hepatomegaly present, liver nontender to palpation    Hernias:  No hernias present  Lymphatic: Lymph Nodes:  No other lymphadenopathy present  Skin:  General Inspection:  No rashes present, no lesions present, no areas of  discoloration  Neurologic:    Mental Status:  Oriented X3.  Normal strength and tone, sensory exam                grossly normal, mentation intact and  speech normal.    Psychiatric:   Mentation appears normal and affect normal/bright.         Pelvic Exam:  External Genitalia:     Normal appearance for age, no discharge present, no tenderness present, no inflammatory lesions present, color normal  Vagina:     Normal vaginal vault without central or paravaginal defects, no discharge present, no inflammatory lesions present, no masses present  Bladder:     Nontender to palpation  Urethra:   Urethral Body:  Urethra palpation normal, urethra structural support normal   Urethral Meatus:  No erythema or lesions present  Cervix:     Appearance healthy, no lesions present, nontender to palpation, no bleeding present  Uterus:     Uterus: firm, normal sized and nontender  Adnexa:     No adnexal tenderness present, no adnexal masses present  Perineum:     Perineum within normal limits, no evidence of trauma, no rashes or skin lesions present  Anus:     Anus within normal limits, no hemorrhoids present  Inguinal Lymph Nodes:     No lymphadenopathy present  Pubic Hair:     Normal pubic hair distribution for age  Genitalia and Groin:     No rashes present, no lesions present, no areas of discoloration, no masses present      COUNSELING:   Reviewed preventive health counseling, as reflected in patient instructions    BMI: There is no height or weight on file to calculate BMI.    ASSESSMENT:  62 year old female with satisfactory annual exam.    ICD-10-CM    1. Encounter for gynecological examination without abnormal finding  Z01.419        PLAN:  1. Thyroid screening  2. Not due for a pap at this time  3. Mammogram today      Vandana Bates MD

## 2022-02-01 ENCOUNTER — ANCILLARY PROCEDURE (OUTPATIENT)
Dept: MAMMOGRAPHY | Facility: CLINIC | Age: 62
End: 2022-02-01
Attending: NURSE PRACTITIONER
Payer: COMMERCIAL

## 2022-02-01 ENCOUNTER — OFFICE VISIT (OUTPATIENT)
Dept: OBGYN | Facility: CLINIC | Age: 62
End: 2022-02-01
Payer: COMMERCIAL

## 2022-02-01 VITALS
BODY MASS INDEX: 29.63 KG/M2 | HEIGHT: 70 IN | WEIGHT: 207 LBS | DIASTOLIC BLOOD PRESSURE: 80 MMHG | SYSTOLIC BLOOD PRESSURE: 122 MMHG

## 2022-02-01 DIAGNOSIS — Z12.31 VISIT FOR SCREENING MAMMOGRAM: ICD-10-CM

## 2022-02-01 DIAGNOSIS — Z01.419 ENCOUNTER FOR GYNECOLOGICAL EXAMINATION WITHOUT ABNORMAL FINDING: Primary | ICD-10-CM

## 2022-02-01 DIAGNOSIS — Z13.29 SCREENING FOR THYROID DISORDER: ICD-10-CM

## 2022-02-01 LAB — TSH SERPL DL<=0.005 MIU/L-ACNC: 1.84 MU/L (ref 0.4–4)

## 2022-02-01 PROCEDURE — 36415 COLL VENOUS BLD VENIPUNCTURE: CPT | Performed by: OBSTETRICS & GYNECOLOGY

## 2022-02-01 PROCEDURE — 84443 ASSAY THYROID STIM HORMONE: CPT | Performed by: OBSTETRICS & GYNECOLOGY

## 2022-02-01 PROCEDURE — 99396 PREV VISIT EST AGE 40-64: CPT | Performed by: OBSTETRICS & GYNECOLOGY

## 2022-02-01 PROCEDURE — 77067 SCR MAMMO BI INCL CAD: CPT | Mod: TC | Performed by: RADIOLOGY

## 2022-02-01 ASSESSMENT — ANXIETY QUESTIONNAIRES
2. NOT BEING ABLE TO STOP OR CONTROL WORRYING: NOT AT ALL
5. BEING SO RESTLESS THAT IT IS HARD TO SIT STILL: NOT AT ALL
IF YOU CHECKED OFF ANY PROBLEMS ON THIS QUESTIONNAIRE, HOW DIFFICULT HAVE THESE PROBLEMS MADE IT FOR YOU TO DO YOUR WORK, TAKE CARE OF THINGS AT HOME, OR GET ALONG WITH OTHER PEOPLE: NOT DIFFICULT AT ALL
6. BECOMING EASILY ANNOYED OR IRRITABLE: NOT AT ALL
1. FEELING NERVOUS, ANXIOUS, OR ON EDGE: NOT AT ALL
7. FEELING AFRAID AS IF SOMETHING AWFUL MIGHT HAPPEN: NOT AT ALL
3. WORRYING TOO MUCH ABOUT DIFFERENT THINGS: NOT AT ALL
GAD7 TOTAL SCORE: 0

## 2022-02-01 ASSESSMENT — PATIENT HEALTH QUESTIONNAIRE - PHQ9
SUM OF ALL RESPONSES TO PHQ QUESTIONS 1-9: 1
5. POOR APPETITE OR OVEREATING: NOT AT ALL

## 2022-02-01 ASSESSMENT — MIFFLIN-ST. JEOR: SCORE: 1576.26

## 2022-02-02 ASSESSMENT — ANXIETY QUESTIONNAIRES: GAD7 TOTAL SCORE: 0

## 2023-02-15 NOTE — PROGRESS NOTES
Nany is a 63 year old  female who presents for annual exam.     Besides routine health maintenance,  she would like to discuss sleeping problem     HPI:  The patient's PCP is Josie Sherwood NP.  Follows regularly, does labs.    Colonoscopy scheduled.    Some spotting here and there.     Has CPAP, notes often wakes, has elev HR at times she notices.   No hotflashes/night sweats.   Has sleep med f/u soon      GYNECOLOGIC HISTORY:    Patient's last menstrual period was 2016.        Her current contraception method is: menopause.  She  reports that she has never smoked. She has never used smokeless tobacco.    Patient is not sexually active.  STD testing offered?  Declined  Last PHQ-9 score on record =   PHQ-9 SCORE 2023   PHQ-9 Total Score 2     Last GAD7 score on record =   TOREY-7 SCORE 2023   Total Score 0     Alcohol Score = 2    HEALTH MAINTENANCE:  Cholesterol:   Recent Labs   Lab Test 18  1047 17  0933   CHOL 273* 252*   HDL 91 99   * 140*   TRIG 66 67     Last Mammo: One year ago, Result: Normal, Next Mammo: Today   Pap: (  Lab Results   Component Value Date    PAP NIL HPV- 2018      Colonoscopy:  3/13/2013, Result: Normal, Next Colonoscopy: coming up 3/2023  Dexa:  N/A    Health maintenance updated:  yes    HISTORY:  OB History    Para Term  AB Living   3 3 3 0 0 3   SAB IAB Ectopic Multiple Live Births   0 0 0 0 3      # Outcome Date GA Lbr Iggy/2nd Weight Sex Delivery Anes PTL Lv   3 Term         JESSICA   2 Term         JESSICA   1 Term         JESSICA       There is no problem list on file for this patient.    Past Surgical History:   Procedure Laterality Date     HERNIA REPAIR        Social History     Tobacco Use     Smoking status: Never     Smokeless tobacco: Never   Substance Use Topics     Alcohol use: Yes     Alcohol/week: 0.0 standard drinks      Problem (# of Occurrences) Relation (Name,Age of Onset)    Diabetes (1) Brother     "Hyperlipidemia (2) Mother, Father    Coronary Artery Disease (1) Father    Fractures (1) Mother: hip    No Known Problems (5) Sister, Maternal Grandmother, Maternal Grandfather, Paternal Grandmother, Other            Current Outpatient Medications   Medication Sig     atorvastatin (LIPITOR) 10 MG tablet      BREO ELLIPTA 100-25 MCG/INH oral inhaler INHALE 1 PUFF D. RINSE MOUTH WELL     CINNAMON PO      Cyanocobalamin (VITAMIN B 12 PO)      DULERA 100-5 MCG/ACT oral inhaler 2 PUFFS BID. USE SPACER AND RINSE MOUTH WELL     fexofenadine (ALLEGRA) 180 MG tablet Take 180 mg by mouth daily     fluticasone (FLONASE) 50 MCG/ACT nasal spray Spray 2 sprays into both nostrils daily     fluticasone (FLOVENT HFA) 110 MCG/ACT inhaler Inhale 2 puffs into the lungs 2 times daily     vitamin D3 (CHOLECALCIFEROL) 62271 UNITS capsule TK 1 C PO Q OTHER WEEK     amoxicillin (AMOXIL) 500 MG tablet TK 2 TS PO BID FOR 10 DAYS WITH URI (Patient not taking: Reported on 2/16/2023)     No current facility-administered medications for this visit.     Allergies   Allergen Reactions     Dust Mite Extract Unknown       Past medical, surgical, social and family histories were reviewed and updated in EPIC.    ROS:   12 point review of systems negative other than symptoms noted below or in the HPI.  No urinary frequency or dysuria, bladder or kidney problems    EXAM:  /78   Pulse 72   Ht 1.676 m (5' 6\")   Wt 93.4 kg (206 lb)   LMP 06/23/2016   BMI 33.25 kg/m     BMI: Body mass index is 33.25 kg/m .    PHYSICAL EXAM:  Constitutional:   Appearance: Well nourished, well developed, alert, in no acute distress  Neck:  Lymph Nodes:  No lymphadenopathy present    Thyroid:  Gland size normal, nontender, no nodules or masses present  on palpation  Chest:  Respiratory Effort:  Breathing unlabored  Cardiovascular:    Heart: Auscultation:  Regular rate, normal rhythm, no murmurs present  Breasts: Inspection of Breasts:  No lymphadenopathy present., " Palpation of Breasts and Axillae:  No masses present on palpation, no breast tenderness., Axillary Lymph Nodes:  No lymphadenopathy present. and No nodularity, asymmetry or nipple discharge bilaterally.  Gastrointestinal:   Abdominal Examination:  Abdomen nontender to palpation, tone normal without rigidity or guarding, no masses present, umbilicus without lesions   Liver and Spleen:  No hepatomegaly present, liver nontender to palpation    Hernias:  No hernias present  Lymphatic: Lymph Nodes:  No other lymphadenopathy present  Skin:  General Inspection:  No rashes present, no lesions present, no areas of  discoloration  Neurologic:    Mental Status:  Oriented X3.  Normal strength and tone, sensory exam                grossly normal, mentation intact and speech normal.    Psychiatric:   Mentation appears normal and affect normal/bright.         Pelvic Exam:  External Genitalia:     Normal appearance for age, no discharge present, no tenderness present, no inflammatory lesions present, color normal  Vagina:     Normal vaginal vault without central or paravaginal defects, no discharge present, no inflammatory lesions present, no masses present  Bladder:     Nontender to palpation  Urethra:   Urethral Body:  Urethra palpation normal, urethra structural support normal   Urethral Meatus:  No erythema or lesions present  Cervix:     Appearance healthy, no lesions present, nontender to palpation, no bleeding present  Uterus:     Uterus: firm, normal sized and nontender, midplane in position.   Adnexa:     No adnexal tenderness present, no adnexal masses present  Perineum:     Perineum within normal limits, no evidence of trauma, no rashes or skin lesions present  Anus:     Anus within normal limits, no hemorrhoids present  Inguinal Lymph Nodes:     No lymphadenopathy present  Pubic Hair:     Normal pubic hair distribution for age  Genitalia and Groin:     No rashes present, no lesions present, no areas of discoloration, no  masses present      COUNSELING:   Reviewed preventive health counseling, as reflected in patient instructions       Osteoporosis prevention/bone health    BMI: Body mass index is 33.25 kg/m .      ASSESSMENT:  63 year old female with satisfactory annual exam.    ICD-10-CM    1. Encounter for gynecological examination without abnormal finding  Z01.419 Pap thin layer screen with HPV - recommended age 30 - 65 years      2. PMB (postmenopausal bleeding)  N95.0 US Transvaginal Pelvic Non-OB          PLAN:  -Pap/hpv obtained for cervical cancer screening. Manage per guidelines, including q 3yr pap testing for those <30 and q 5yr pap/hpv for those >30 when appropriate.   -Breast self awareness discussed. Is UTD for mammogram.  -Colonoscopy scheduled  -Osteoporosis prevention discussed.  -PCP manages labs  -PMB. Recommend further eval with ultrasound. Discussed concerns for potential hyperplasia/malignancy with bleeding after menopause. patient will return for ultrasound and discussion.  -F/U with sleep med. Does not appear related to menopausal sx.   -Return one year for next annual exam          Angelica Avilez Masters, DO

## 2023-02-16 ENCOUNTER — OFFICE VISIT (OUTPATIENT)
Dept: OBGYN | Facility: CLINIC | Age: 63
End: 2023-02-16
Payer: COMMERCIAL

## 2023-02-16 ENCOUNTER — ANCILLARY PROCEDURE (OUTPATIENT)
Dept: MAMMOGRAPHY | Facility: CLINIC | Age: 63
End: 2023-02-16
Payer: COMMERCIAL

## 2023-02-16 VITALS
SYSTOLIC BLOOD PRESSURE: 118 MMHG | WEIGHT: 206 LBS | HEIGHT: 66 IN | DIASTOLIC BLOOD PRESSURE: 78 MMHG | HEART RATE: 72 BPM | BODY MASS INDEX: 33.11 KG/M2

## 2023-02-16 DIAGNOSIS — Z01.419 ENCOUNTER FOR GYNECOLOGICAL EXAMINATION WITHOUT ABNORMAL FINDING: Primary | ICD-10-CM

## 2023-02-16 DIAGNOSIS — N95.0 PMB (POSTMENOPAUSAL BLEEDING): ICD-10-CM

## 2023-02-16 DIAGNOSIS — Z12.31 VISIT FOR SCREENING MAMMOGRAM: ICD-10-CM

## 2023-02-16 PROCEDURE — 77067 SCR MAMMO BI INCL CAD: CPT | Mod: TC | Performed by: RADIOLOGY

## 2023-02-16 PROCEDURE — G0145 SCR C/V CYTO,THINLAYER,RESCR: HCPCS | Performed by: OBSTETRICS & GYNECOLOGY

## 2023-02-16 PROCEDURE — 99396 PREV VISIT EST AGE 40-64: CPT | Performed by: OBSTETRICS & GYNECOLOGY

## 2023-02-16 PROCEDURE — 87624 HPV HI-RISK TYP POOLED RSLT: CPT | Performed by: OBSTETRICS & GYNECOLOGY

## 2023-02-16 RX ORDER — ATORVASTATIN CALCIUM 10 MG/1
TABLET, FILM COATED ORAL
COMMUNITY
Start: 2023-02-14

## 2023-02-16 ASSESSMENT — ANXIETY QUESTIONNAIRES
5. BEING SO RESTLESS THAT IT IS HARD TO SIT STILL: NOT AT ALL
2. NOT BEING ABLE TO STOP OR CONTROL WORRYING: NOT AT ALL
6. BECOMING EASILY ANNOYED OR IRRITABLE: NOT AT ALL
7. FEELING AFRAID AS IF SOMETHING AWFUL MIGHT HAPPEN: NOT AT ALL
IF YOU CHECKED OFF ANY PROBLEMS ON THIS QUESTIONNAIRE, HOW DIFFICULT HAVE THESE PROBLEMS MADE IT FOR YOU TO DO YOUR WORK, TAKE CARE OF THINGS AT HOME, OR GET ALONG WITH OTHER PEOPLE: NOT DIFFICULT AT ALL
3. WORRYING TOO MUCH ABOUT DIFFERENT THINGS: NOT AT ALL
GAD7 TOTAL SCORE: 0
1. FEELING NERVOUS, ANXIOUS, OR ON EDGE: NOT AT ALL
GAD7 TOTAL SCORE: 0

## 2023-02-16 ASSESSMENT — PATIENT HEALTH QUESTIONNAIRE - PHQ9
5. POOR APPETITE OR OVEREATING: NOT AT ALL
SUM OF ALL RESPONSES TO PHQ QUESTIONS 1-9: 2

## 2023-02-20 LAB
BKR LAB AP GYN ADEQUACY: NORMAL
BKR LAB AP GYN INTERPRETATION: NORMAL
BKR LAB AP HPV REFLEX: NORMAL
BKR LAB AP PREVIOUS ABNORMAL: NORMAL
PATH REPORT.COMMENTS IMP SPEC: NORMAL
PATH REPORT.COMMENTS IMP SPEC: NORMAL
PATH REPORT.RELEVANT HX SPEC: NORMAL

## 2023-02-23 LAB
HUMAN PAPILLOMA VIRUS 16 DNA: NEGATIVE
HUMAN PAPILLOMA VIRUS 18 DNA: NEGATIVE
HUMAN PAPILLOMA VIRUS FINAL DIAGNOSIS: NORMAL
HUMAN PAPILLOMA VIRUS OTHER HR: NEGATIVE

## 2023-04-27 NOTE — PATIENT INSTRUCTIONS
-Daily total calcium intake (between food/supplements) should be 1200mg which equates to 5 servings calcium containing food per day; VItamin D 1000IU.   Foods rich in calcium are: milk, cheese, yogurt, seafood, sardines and canned salmon, leafy green vegetables such as remedios greens, spinach and kale, beans and lentils, almonds, seeds (poppy, sesame, celery, diana), rhubarb, dried fruit such as figs, whey protein, tofu and edamame, amaranth, other foods with added calcium such as orange juice and some cereals.   If adequate amount not taken in diet, then a supplement may be needed.     -I also recommend increasing your dietary fiber by starting Metamucil (powder mixed in glass of water) once to twice daily     Rhombic Flap Text: The defect edges were debeveled with a #15 scalpel blade.  Given the location of the defect and the proximity to free margins a rhombic flap was deemed most appropriate.  Using a sterile surgical marker, an appropriate rhombic flap was drawn incorporating the defect.    The area thus outlined was incised deep to adipose tissue with a #15 scalpel blade.  The skin margins were undermined to an appropriate distance in all directions utilizing iris scissors.

## 2023-05-11 ENCOUNTER — ANCILLARY PROCEDURE (OUTPATIENT)
Dept: ULTRASOUND IMAGING | Facility: CLINIC | Age: 63
End: 2023-05-11
Attending: OBSTETRICS & GYNECOLOGY
Payer: COMMERCIAL

## 2023-05-11 ENCOUNTER — OFFICE VISIT (OUTPATIENT)
Dept: OBGYN | Facility: CLINIC | Age: 63
End: 2023-05-11
Attending: OBSTETRICS & GYNECOLOGY
Payer: COMMERCIAL

## 2023-05-11 VITALS
HEIGHT: 66 IN | WEIGHT: 191.2 LBS | SYSTOLIC BLOOD PRESSURE: 115 MMHG | DIASTOLIC BLOOD PRESSURE: 72 MMHG | BODY MASS INDEX: 30.73 KG/M2

## 2023-05-11 DIAGNOSIS — N95.0 PMB (POSTMENOPAUSAL BLEEDING): Primary | ICD-10-CM

## 2023-05-11 DIAGNOSIS — N95.0 PMB (POSTMENOPAUSAL BLEEDING): ICD-10-CM

## 2023-05-11 DIAGNOSIS — N95.2 VAGINAL ATROPHY: ICD-10-CM

## 2023-05-11 DIAGNOSIS — N85.9 FLUID IN ENDOMETRIAL CAVITY: ICD-10-CM

## 2023-05-11 PROCEDURE — 76830 TRANSVAGINAL US NON-OB: CPT | Performed by: OBSTETRICS & GYNECOLOGY

## 2023-05-11 PROCEDURE — 99213 OFFICE O/P EST LOW 20 MIN: CPT | Mod: 25 | Performed by: OBSTETRICS & GYNECOLOGY

## 2023-05-11 PROCEDURE — 58100 BIOPSY OF UTERUS LINING: CPT | Performed by: OBSTETRICS & GYNECOLOGY

## 2023-05-11 PROCEDURE — 88305 TISSUE EXAM BY PATHOLOGIST: CPT | Performed by: PATHOLOGY

## 2023-05-11 NOTE — PROGRESS NOTES
"  SUBJECTIVE:                                                   Nany Pascual is a 63 year old female who presents to clinic today for the following health issue(s):  Patient presents with:  Follow Up      Additional information: US review    HPI:  Reported vaginal spotting at her annual in February. Was \"here and there\".     Has not had symptoms of vaginal dryness.    Patient's last menstrual period was 2016..     Patient is not sexually active, .  Using menopause for contraception.    reports that she has never smoked. She has never used smokeless tobacco.    STD testing offered?  Declined - not sexually active    Health maintenance updated:  See care gaps    Today's PHQ-2 Score:       2023     9:08 AM   PHQ-2 (  [employer])   Q1: Little interest or pleasure in doing things 0   Q2: Feeling down, depressed or hopeless 0   PHQ-2 Score 0     Today's PHQ-9 Score:       2023     9:08 AM   PHQ-9 SCORE   PHQ-9 Total Score 2     Today's TOREY-7 Score:       2023     9:08 AM   TOREY-7 SCORE   Total Score 0       Problem list and histories reviewed & adjusted, as indicated.  Additional history: as documented.    There is no problem list on file for this patient.    Past Surgical History:   Procedure Laterality Date     HERNIA REPAIR        Social History     Tobacco Use     Smoking status: Never     Smokeless tobacco: Never   Vaping Use     Vaping status: Never Used   Substance Use Topics     Alcohol use: Yes     Alcohol/week: 0.0 standard drinks of alcohol      Problem (# of Occurrences) Relation (Name,Age of Onset)    Diabetes (1) Brother    Hyperlipidemia (2) Mother, Father    Coronary Artery Disease (1) Father    Fractures (1) Mother: hip    No Known Problems (5) Sister, Maternal Grandmother, Maternal Grandfather, Paternal Grandmother, Other            Current Outpatient Medications   Medication Sig     atorvastatin (LIPITOR) 10 MG tablet      BREO ELLIPTA 100-25 MCG/INH oral inhaler INHALE 1 " "PUFF D. RINSE MOUTH WELL     CINNAMON PO      Cyanocobalamin (VITAMIN B 12 PO)      fexofenadine (ALLEGRA) 180 MG tablet Take 180 mg by mouth daily     fluticasone (FLONASE) 50 MCG/ACT nasal spray Spray 2 sprays into both nostrils daily     fluticasone (FLOVENT HFA) 110 MCG/ACT inhaler Inhale 2 puffs into the lungs 2 times daily     vitamin D3 (CHOLECALCIFEROL) 23271 UNITS capsule TK 1 C PO Q OTHER WEEK     No current facility-administered medications for this visit.     Allergies   Allergen Reactions     Dust Mite Extract Unknown           OBJECTIVE:     /72   Ht 1.676 m (5' 6\")   Wt 86.7 kg (191 lb 3.2 oz)   LMP 06/23/2016   Breastfeeding No   BMI 30.86 kg/m    Body mass index is 30.86 kg/m .    Exam:  Constitutional:  Appearance: Well nourished, well developed alert, in no acute distress  Neurologic:  Mental Status:  Oriented X3.  Normal strength and tone, sensory exam grossly normal, mentation intact and speech normal.    Psychiatric:  Mentation appears normal and affect normal/bright.  Pelvic Exam:  External Genitalia:     Normal appearance for age, no discharge present, no tenderness present, no inflammatory lesions present, color normal  Vagina:     Normal vaginal vault without central or paravaginal defects, ATROPHIC  Bladder:     Nontender to palpation  Urethra:   Urethral Body:  Urethra palpation normal, urethra structural support normal   Urethral Meatus:  No erythema or lesions present  Cervix:     Appearance healthy, no lesions present, nontender to palpation, no bleeding present  Uterus:     Nontender to palpation, no masses present, position anteflexed, mobility: normal  Adnexa:     No adnexal tenderness present, no adnexal masses present  Perineum:     Perineum within normal limits, no evidence of trauma, no rashes or skin lesions present  Inguinal Lymph Nodes:     No lymphadenopathy present       In-Clinic Test Results:  Results for orders placed or performed in visit on 05/11/23 (from " the past 24 hour(s))   US Transvaginal Pelvic Non-OB    Narrative    Table formatting from the original result was not included.       Gynecological Ultrasound Report  Pelvic U/S - Transvaginal  [facility]  Referring Provider: Angelica Rodriguez DO  Sonographer:  Pierre Gaviria RDMS  Indication: Bleeding/Menses- Postmenopausal bleeding, spotting  LMP: Patient's last menstrual period was 06/23/2016.    Gynecological Ultrasonography:   Uterus: anteverted. Contour is smooth/regular.  Size: 6.2 x 4.1 x 2.3 cm  Endometrium: Thickness Total 2.2 mm  Findings: Fluid in endometrium 0.6 x 0.2 x 1.0 cm  Right Ovary: 0.6 x 1.1 x 1.4 cm. Wnl  Left Ovary: 0.7 x 0.8 x 1.2 cm. Wnl  Cul de Sac Free Fluid: No free fluid     Impression:   Endometrium 2.2mm which is appropriate for menopause, however, there is   fluid within the endometrium.   Normal adnexa bilaterally.  No free fluid.    Angelica Rodriguez DO          ASSESSMENT/PLAN:                                                        ICD-10-CM    1. PMB (postmenopausal bleeding)  N95.0 Surgical pathology exam     ENDOMETRIAL BIOPSY W/O CERVICAL DILATION      2. Vaginal atrophy  N95.2       3. Fluid in endometrial cavity  N85.9               PMB. Reviewed ultrasound findings with Nany. Fluid within endometrium, indication for biopsy. EMB performed w/o issues.  Tissue sample obtained with small discussed how this may hinder proper diagnosis if tissue sample is insufficient.  This would be linked to the small endometrial tissue available at 2.2 mm.  Will await pathology results to determine next steps.  Vaginal tissues atrophic.  Appears to have been irritated from the vaginal ultrasound had prior to examination.  Some small petechiae noted on the vaginal fornices and the cervix.  May consider trial of vaginal estrogen for short course, pending results of the endometrial biopsy as this may be the source of some of her bleeding.     (20 minutes was spent on the  date of the encounter doing chart review, review and interpretation of pertinent test results, history and/or exam, documentation, patient counseling on top of the time spent on the procedure.)    Angelica Rodriguez DO  [facility] [place]        INDICATIONS:                                                    Is a pregnancy test required: No.  Was a consent obtained?  Yes    Having endometrial biopsy for post-menopausal bleeding        PROCEDURE;                                                      A speculum was placed in the vagina and cervix prepped with betadine. A tenaculum was attached to the cervix. A small plastic 5 mm Pipelle syringe curette was inserted into the cervical canal. The uterus was sounded to 6.5 cm's. A vigorous four quadrant biopsy was performed, removing amount small  of tissue. The speculum was removed. This tissue was placed in Formalin and sent to pathology.    The patient tolerated the procedure  well and she reported there was  cramping.      POST PROCEDURE;                                                      There  was no cramping at the time of discharge. She  tolerated the procedure well. There were no complications. Patient was discharged in stable condition.    Patient advised to call the clinic if severe pelvic pain, fever or heavy bleeding.    Angelica Rodriguez DO

## 2023-05-15 LAB
PATH REPORT.COMMENTS IMP SPEC: NORMAL
PATH REPORT.COMMENTS IMP SPEC: NORMAL
PATH REPORT.FINAL DX SPEC: NORMAL
PATH REPORT.GROSS SPEC: NORMAL
PATH REPORT.MICROSCOPIC SPEC OTHER STN: NORMAL
PATH REPORT.RELEVANT HX SPEC: NORMAL
PHOTO IMAGE: NORMAL

## 2024-02-22 ENCOUNTER — ANCILLARY PROCEDURE (OUTPATIENT)
Dept: MAMMOGRAPHY | Facility: CLINIC | Age: 64
End: 2024-02-22
Payer: COMMERCIAL

## 2024-02-22 ENCOUNTER — OFFICE VISIT (OUTPATIENT)
Dept: OBGYN | Facility: CLINIC | Age: 64
End: 2024-02-22
Payer: COMMERCIAL

## 2024-02-22 VITALS
HEIGHT: 68 IN | DIASTOLIC BLOOD PRESSURE: 72 MMHG | SYSTOLIC BLOOD PRESSURE: 114 MMHG | BODY MASS INDEX: 26.43 KG/M2 | WEIGHT: 174.4 LBS

## 2024-02-22 DIAGNOSIS — Z12.31 VISIT FOR SCREENING MAMMOGRAM: ICD-10-CM

## 2024-02-22 DIAGNOSIS — Z01.419 ENCOUNTER FOR GYNECOLOGICAL EXAMINATION WITHOUT ABNORMAL FINDING: Primary | ICD-10-CM

## 2024-02-22 PROCEDURE — 99396 PREV VISIT EST AGE 40-64: CPT | Performed by: OBSTETRICS & GYNECOLOGY

## 2024-02-22 PROCEDURE — 77067 SCR MAMMO BI INCL CAD: CPT | Mod: TC | Performed by: RADIOLOGY

## 2024-02-22 ASSESSMENT — ANXIETY QUESTIONNAIRES
6. BECOMING EASILY ANNOYED OR IRRITABLE: NOT AT ALL
3. WORRYING TOO MUCH ABOUT DIFFERENT THINGS: NOT AT ALL
GAD7 TOTAL SCORE: 0
IF YOU CHECKED OFF ANY PROBLEMS ON THIS QUESTIONNAIRE, HOW DIFFICULT HAVE THESE PROBLEMS MADE IT FOR YOU TO DO YOUR WORK, TAKE CARE OF THINGS AT HOME, OR GET ALONG WITH OTHER PEOPLE: NOT DIFFICULT AT ALL
1. FEELING NERVOUS, ANXIOUS, OR ON EDGE: NOT AT ALL
2. NOT BEING ABLE TO STOP OR CONTROL WORRYING: NOT AT ALL
7. FEELING AFRAID AS IF SOMETHING AWFUL MIGHT HAPPEN: NOT AT ALL
5. BEING SO RESTLESS THAT IT IS HARD TO SIT STILL: NOT AT ALL
GAD7 TOTAL SCORE: 0

## 2024-02-22 ASSESSMENT — PATIENT HEALTH QUESTIONNAIRE - PHQ9
5. POOR APPETITE OR OVEREATING: NOT AT ALL
SUM OF ALL RESPONSES TO PHQ QUESTIONS 1-9: 0

## 2024-02-22 NOTE — PROGRESS NOTES
Nany is a 64 year old  female who presents for women's health exam.     Besides routine health maintenance, she has no other health concerns today .    HPI:  The patient's PCP is  Josie Sherwood NP.      No concerns. No vb/discharge. No vaginal or breast complaints.      GYNECOLOGIC HISTORY:    Patient's last menstrual period was 2016.  Her current contraception method is: menopause.  She  reports that she has never smoked. She has never used smokeless tobacco.  Patient is sexually active.  STD testing offered?  Declined  Last PHQ-9 score on record =       2023     9:08 AM   PHQ-9 SCORE   PHQ-9 Total Score 2     Last GAD7 score on record =       2023     9:08 AM   TOREY-7 SCORE   Total Score 0     Alcohol Score = 3    HEALTH MAINTENANCE:  Recent Labs   Lab Test 18  1047 17  0933   CHOL 273* 252*   HDL 91 99   * 140*   TRIG 66 67   Lipids followed by primary    Last Mammo: One year ago, Result: Normal, Next Mammo: Today   Pap:   Lab Results   Component Value Date    GYNINTERP  2023     Negative for Intraepithelial Lesion or Malignancy (NILM)    PAP NIL 2018    HPV-    Colonoscopy:  3/13/13, Result: Normal, Next Colonoscopy: 10 years.  Dexa:  NA    Health maintenance updated:  yes  Overdue          Never done ADVANCE CARE PLANNING (Every 5 Years)     Never done HIV SCREENING (Once)     Never done HEPATITIS C SCREENING (Once)     Never done RSV VACCINE (Pregnancy & 60+) (1 - 1-dose 60+ series)     DEC 19   2021 GLUCOSE (Every 3 Years)  Last completed: Dec 19, 2018     MAR 13   2023 COLORECTAL CANCER SCREENING (COLONOSCOPY) (Every 10 Years)  Last completed: Mar 13, 2013     DEC 19   2023 LIPID (Every 5 Years)  Last completed: Dec 19, 2018     HISTORY:  OB History    Para Term  AB Living   3 3 3 0 0 3   SAB IAB Ectopic Multiple Live Births   0 0 0 0 3      # Outcome Date GA Lbr Iggy/2nd Weight Sex Delivery Anes PTL Lv   3 Term         JESSICA   2 Term     "     JESSICA   1 Term         JESSICA       There is no problem list on file for this patient.    Past Surgical History:   Procedure Laterality Date    HERNIA REPAIR        Social History     Tobacco Use    Smoking status: Never    Smokeless tobacco: Never   Substance Use Topics    Alcohol use: Yes     Alcohol/week: 0.0 standard drinks of alcohol      Problem (# of Occurrences) Relation (Name,Age of Onset)    Diabetes (1) Brother    Hyperlipidemia (2) Mother, Father    Coronary Artery Disease (1) Father    Fractures (1) Mother: hip    No Known Problems (5) Sister, Maternal Grandmother, Maternal Grandfather, Paternal Grandmother, Other              Current Outpatient Medications   Medication Sig    atorvastatin (LIPITOR) 10 MG tablet     BREO ELLIPTA 100-25 MCG/INH oral inhaler INHALE 1 PUFF D. RINSE MOUTH WELL    CINNAMON PO     Cyanocobalamin (VITAMIN B 12 PO)     fexofenadine (ALLEGRA) 180 MG tablet Take 180 mg by mouth daily    fluticasone (FLONASE) 50 MCG/ACT nasal spray Spray 2 sprays into both nostrils daily    fluticasone (FLOVENT HFA) 110 MCG/ACT inhaler Inhale 2 puffs into the lungs 2 times daily    vitamin D3 (CHOLECALCIFEROL) 83193 UNITS capsule TK 1 C PO Q OTHER WEEK     No current facility-administered medications for this visit.     Allergies   Allergen Reactions    Dust Mite Extract Unknown       Past medical, surgical, social and family histories were reviewed and updated in [facility].    EXAM:  /72   Ht 1.727 m (5' 8\")   Wt 79.1 kg (174 lb 6.4 oz)   LMP 2016   BMI 26.52 kg/m     BMI: Body mass index is 26.52 kg/m .    PHYSICAL EXAM:  Constitutional:   Appearance: Well nourished, well developed, alert, in no acute distress  Neck:  Lymph Nodes:  No lymphadenopathy present    Thyroid:  Gland size normal, nontender, no nodules or masses present  on palpation  Chest:  Respiratory Effort:  Breathing unlabored  Cardiovascular:    Heart: Auscultation:  Regular rate, normal rhythm, no murmurs " present  Breasts: Inspection of Breasts:  No lymphadenopathy present., Palpation of Breasts and Axillae:  No masses present on palpation, no breast tenderness., Axillary Lymph Nodes:  No lymphadenopathy present., and No nodularity, asymmetry or nipple discharge bilaterally.  Gastrointestinal:   Abdominal Examination:  Abdomen nontender to palpation, tone normal without rigidity or guarding, no masses present, umbilicus without lesions   Liver and Spleen:  No hepatomegaly present, liver nontender to palpation    Hernias:  No hernias present  Lymphatic: Lymph Nodes:  No other lymphadenopathy present  Skin:  General Inspection:  No rashes present, no lesions present, no areas of  discoloration  Neurologic:    Mental Status:  Oriented X3.  Normal strength and tone, sensory exam                grossly normal, mentation intact and speech normal.    Psychiatric:   Mentation appears normal and affect normal/bright.         Pelvic Exam:  External Genitalia:     Normal appearance for age, no discharge present, no tenderness present, no inflammatory lesions present, color normal  Vagina:     Normal vaginal vault without central or paravaginal defects, no discharge present, no inflammatory lesions present, no masses present  Bladder:     Nontender to palpation  Urethra:   Urethral Body:  Urethra palpation normal, urethra structural support normal   Urethral Meatus:  No erythema or lesions present  Cervix:     Appearance healthy, no lesions present, nontender to palpation, no bleeding present  Uterus:     Uterus: firm, normal sized and nontender, midplane in position.   Adnexa:     No adnexal tenderness present, no adnexal masses present  Perineum:     Perineum within normal limits, no evidence of trauma, no rashes or skin lesions present  Anus:     Anus within normal limits, no hemorrhoids present  Inguinal Lymph Nodes:     No lymphadenopathy present  Pubic Hair:     Normal pubic hair distribution for age  Genitalia and Groin:      No rashes present, no lesions present, no areas of discoloration, no masses present    COUNSELING:   Reviewed preventive health counseling, as reflected in patient instructions       Osteoporosis prevention/bone health    BMI: Body mass index is 26.52 kg/m .      ASSESSMENT:  64 year old female with satisfactory annual exam.    ICD-10-CM    1. Encounter for gynecological examination without abnormal finding  Z01.419           PLAN:  -UTD for cervical cancer screening.   -Breast self awareness discussed. Due for mammogram.  -Osteoporosis prevention discussed.  -PCP manages labs and general health and preventative medicine  -PMB precautions discussed  -Return one year for next annual exam        Angelica Avilez Masters, DO

## 2024-02-22 NOTE — PATIENT INSTRUCTIONS
-Daily total calcium intake (between food/supplements) should be 1200mg which equates to 5 servings calcium containing food per day; VItamin D 1000IU.   Foods rich in calcium are: milk, cheese, yogurt, seafood, sardines and canned salmon, leafy green vegetables such as remedios greens, spinach and kale, beans and lentils, almonds, seeds (poppy, sesame, celery, diana), rhubarb, dried fruit such as figs, whey protein, tofu and edamame, amaranth, other foods with added calcium such as orange juice and some cereals.   If adequate amount not taken in diet, then a supplement may be needed.     -I also recommend increasing your dietary fiber by starting Metamucil (powder mixed in glass of water) once to twice daily

## 2025-02-27 ENCOUNTER — OFFICE VISIT (OUTPATIENT)
Dept: OBGYN | Facility: CLINIC | Age: 65
End: 2025-02-27
Payer: COMMERCIAL

## 2025-02-27 VITALS
DIASTOLIC BLOOD PRESSURE: 72 MMHG | WEIGHT: 186 LBS | HEIGHT: 69 IN | BODY MASS INDEX: 27.55 KG/M2 | SYSTOLIC BLOOD PRESSURE: 122 MMHG

## 2025-02-27 DIAGNOSIS — N95.0 PMB (POSTMENOPAUSAL BLEEDING): Primary | ICD-10-CM

## 2025-02-27 DIAGNOSIS — Z78.0 ASYMPTOMATIC MENOPAUSAL STATE: ICD-10-CM

## 2025-02-27 DIAGNOSIS — Z01.419 ENCOUNTER FOR BREAST AND PELVIC EXAMINATION: ICD-10-CM

## 2025-02-27 ASSESSMENT — PATIENT HEALTH QUESTIONNAIRE - PHQ9
5. POOR APPETITE OR OVEREATING: NOT AT ALL
SUM OF ALL RESPONSES TO PHQ QUESTIONS 1-9: 0

## 2025-02-27 ASSESSMENT — ANXIETY QUESTIONNAIRES
GAD7 TOTAL SCORE: 0
6. BECOMING EASILY ANNOYED OR IRRITABLE: NOT AT ALL
5. BEING SO RESTLESS THAT IT IS HARD TO SIT STILL: NOT AT ALL
3. WORRYING TOO MUCH ABOUT DIFFERENT THINGS: NOT AT ALL
2. NOT BEING ABLE TO STOP OR CONTROL WORRYING: NOT AT ALL
7. FEELING AFRAID AS IF SOMETHING AWFUL MIGHT HAPPEN: NOT AT ALL
GAD7 TOTAL SCORE: 0
IF YOU CHECKED OFF ANY PROBLEMS ON THIS QUESTIONNAIRE, HOW DIFFICULT HAVE THESE PROBLEMS MADE IT FOR YOU TO DO YOUR WORK, TAKE CARE OF THINGS AT HOME, OR GET ALONG WITH OTHER PEOPLE: NOT DIFFICULT AT ALL
1. FEELING NERVOUS, ANXIOUS, OR ON EDGE: NOT AT ALL

## 2025-02-27 NOTE — PATIENT INSTRUCTIONS
-Daily total calcium intake (between food/supplements) should be 1200mg which equates to 5 servings calcium containing food per day; VItamin D 2000IU. Not only is this important for your bone health as you age, but also vitamin D has a role in decreasing breast cancer risk.   Foods rich in calcium are: milk, cheese, yogurt, seafood, sardines and canned salmon, leafy green vegetables such as remedios greens, spinach and kale, beans and lentils, almonds, seeds (poppy, sesame, celery, diana), rhubarb, dried fruit such as figs, whey protein, tofu and edamame, amaranth, other foods with added calcium such as orange juice and some cereals.   If adequate amount not taken in diet, then a supplement may be needed.     -I also recommend increasing your dietary fiber by starting Metamucil (powder mixed in glass of water) once to twice daily

## 2025-02-27 NOTE — PROGRESS NOTES
SUBJECTIVE:                                                   Nany Pascual is a 65 year old female who presents to clinic today for the following health issue(s):  Breast and pelvic exam     HPI:  Just saw PCP in NOvember.   Recently noted some pain in her hip/legs, stopped lipitor and pain went away. Restarted it and pain returned so stopped it. Was keeping her from climbing stairs even.     Last colonoscopy documented by PCP in  was 3/2/23 outside [facility].     Has some occ spotting noted on pad, darker staining, not sure if urine or blood. Wears pad when works just in case as she lifts and moves boxes all day.     Mom with hx hip fx in 70s. Drinks alcohol couple times per week.     Not much regular exercise        Patient's last menstrual period was 2016..     Patient is sexually active, .  Using menopause for contraception.    reports that she has never smoked. She has never used smokeless tobacco.    STD testing offered?  Declined    Health maintenance updated:  yes  Overdue          Never done ADVANCE CARE PLANNING (Every 5 Years)     Never done HIV SCREENING (Once)     Never done HEPATITIS C SCREENING (Once)     DEC 19  2019 LIPID (Yearly)  Last completed: Dec 19, 2018     JUL 25  2021 Pneumococcal Vaccine: 50+ Years (3 of 3 - PCV20 or PCV21)   Last completed: 2016     DEC 19  2021 GLUCOSE (Every 3 Years)  Last completed: Dec 19, 2018     MAR 13  2023 COLORECTAL CANCER SCREENING (COLONOSCOPY) (Every 10 Years)  Last completed: Mar 13, 2013     SEP 1  2024 INFLUENZA VACCINE (1)  Last completed: Sep 28, 2023     SEP 1  2024 COVID-19 Vaccine ( season)  Last completed: Sep 28, 2023     JAVY 1  2025 PHQ-2 (once per calendar year) (Yearly, January to December)  Last completed:  YEARLY PREVENTIVE VISIT (Yearly)  Last completed: 2024         Upcoming          2026 MAMMO SCREENING (Every 2 Years)   Scheduled for:   16 2028 PAP FOLLOW-UP (Once)  Last completed: Feb 16, 2023 FEB 16 2028 HPV FOLLOW-UP (Once)  Last completed: Feb 16, 2023 NOV 11 2031 DTAP/TDAP/TD IMMUNIZATION (4 - Td or Tdap)   Last completed: Nov 11, 2021 JAN 4 2036 RSV VACCINE (1 - 1-dose 75+ series)         Today's PHQ-2 Score:       2/27/2025     8:33 AM   PHQ-2 ( 1999 [employer])   Q1: Little interest or pleasure in doing things 0   Q2: Feeling down, depressed or hopeless 0   PHQ-2 Score 0     Today's PHQ-9 Score:       2/27/2025     8:33 AM   PHQ-9 SCORE   PHQ-9 Total Score 0     Today's TOREY-7 Score:       2/27/2025     8:33 AM   TOREY-7 SCORE   Total Score 0       Problem list and histories reviewed & adjusted, as indicated.  Additional history: as documented.    Patient Active Problem List   Diagnosis    Encounter for breast and pelvic examination     Past Surgical History:   Procedure Laterality Date    HERNIA REPAIR        Social History     Tobacco Use    Smoking status: Never    Smokeless tobacco: Never   Substance Use Topics    Alcohol use: Yes     Alcohol/week: 0.0 standard drinks of alcohol      Problem (# of Occurrences) Relation (Name,Age of Onset)    Diabetes (1) Brother    Hyperlipidemia (2) Mother, Father    Coronary Artery Disease (1) Father    Fractures (1) Mother: hip, 70s    No Known Problems (5) Sister, Maternal Grandmother, Maternal Grandfather, Paternal Grandmother, Other              Current Outpatient Medications   Medication Sig Dispense Refill    BREO ELLIPTA 100-25 MCG/INH oral inhaler INHALE 1 PUFF D. RINSE MOUTH WELL  0    CINNAMON PO       vitamin D3 (CHOLECALCIFEROL) 36136 UNITS capsule TK 1 C PO Q OTHER WEEK  0    atorvastatin (LIPITOR) 10 MG tablet  (Patient not taking: Reported on 2/27/2025)      Cyanocobalamin (VITAMIN B 12 PO)       fexofenadine (ALLEGRA) 180 MG tablet Take 180 mg by mouth daily      fluticasone (FLONASE) 50 MCG/ACT nasal spray Spray 2 sprays into both nostrils daily 1 Bottle     fluticasone  "(FLOVENT HFA) 110 MCG/ACT inhaler Inhale 2 puffs into the lungs 2 times daily 1 Inhaler 1     No current facility-administered medications for this visit.     Allergies   Allergen Reactions    Dust Mite Extract Unknown       ROS:  12 point review of systems negative other than symptoms noted below or in the HPI.  No urinary frequency or dysuria, bladder or kidney problems      OBJECTIVE:     /72   Ht 1.753 m (5' 9\")   Wt 84.4 kg (186 lb)   LMP 06/23/2016   BMI 27.47 kg/m    Body mass index is 27.47 kg/m .    Exam:  Constitutional:  Appearance: Well nourished, well developed alert, in no acute distress  Breasts:  Inspection of Breasts:  Symmetric bilaterally.  No puckering.  No skin changes.  Palpation of Breasts and Axillae:  No masses present on palpation, no breast tenderness Axillary Lymph Nodes:  No lymphadenopathy present  Psychiatric:  Mentation appears normal and affect normal/bright.  Pelvic Exam:  External Genitalia:     Normal appearance for age, no discharge present, no tenderness present, no inflammatory lesions present, color normal  Vagina:     Normal vaginal vault without central or paravaginal defects, no discharge present, no inflammatory lesions present, no masses present  Bladder:     Nontender to palpation  Urethra:   Urethral Body:  Urethra palpation normal, urethra structural support normal   Urethral Meatus:  No erythema or lesions present  Cervix:     Appearance healthy, no lesions present, nontender to palpation, no bleeding present  Uterus:     Uterus: firm, normal sized and nontender, anteverted in position.   Adnexa:     No adnexal tenderness present, no adnexal masses present  Perineum:     Perineum within normal limits, no evidence of trauma, no rashes or skin lesions present  Anus:     Anus within normal limits, no hemorrhoids present  Inguinal Lymph Nodes:     No lymphadenopathy present  Pubic Hair:     Normal pubic hair distribution for age  Genitalia and Groin:     No " rashes present, no lesions present, no areas of discoloration, no masses present     In-Clinic Test Results:  No results found for this or any previous visit (from the past 24 hours).    ASSESSMENT/PLAN:                                                        ICD-10-CM    1. PMB (postmenopausal bleeding)  N95.0 US Transvaginal Pelvic Non-OB      2. Asymptomatic menopausal state  Z78.0 DX Bone Density      3. Encounter for breast and pelvic examination  Z01.419           Patient Instructions   -Daily total calcium intake (between food/supplements) should be 1200mg which equates to 5 servings calcium containing food per day; VItamin D 2000IU. Not only is this important for your bone health as you age, but also vitamin D has a role in decreasing breast cancer risk.   Foods rich in calcium are: milk, cheese, yogurt, seafood, sardines and canned salmon, leafy green vegetables such as remedios greens, spinach and kale, beans and lentils, almonds, seeds (poppy, sesame, celery, diana), rhubarb, dried fruit such as figs, whey protein, tofu and edamame, amaranth, other foods with added calcium such as orange juice and some cereals.   If adequate amount not taken in diet, then a supplement may be needed.     -I also recommend increasing your dietary fiber by starting Metamucil (powder mixed in glass of water) once to twice daily      -PMB, recurrent. Hx of endometrial fluid. Will check ultrasound  Did discuss concerns for endometrial source of the spotting, what this could mean as far as hyperplasia/malignancy and need to r/o abnormal pathology. Discussed EMB vs hysteroscopy/D&C and likely D&C need to be performed as EMB last done and neg  -UTD for cervical cancer screening.   -Breast self awareness discussed. Due for mammogram.  -Osteoporosis prevention discussed.  Ordered dexa for this year.  Discussed osteoporosis prevention.   Rec weight lifting exercises and rationale for bone health, metabolic health, mobility.   Discussed  negative impact of osteoporosis and major fractures.   -Return one-two years for next women's health exam        Angelica Avilez Masters, DO  [facility] [place]

## 2025-03-12 ENCOUNTER — PREP FOR PROCEDURE (OUTPATIENT)
Dept: OBGYN | Facility: CLINIC | Age: 65
End: 2025-03-12

## 2025-03-12 ENCOUNTER — OFFICE VISIT (OUTPATIENT)
Dept: OBGYN | Facility: CLINIC | Age: 65
End: 2025-03-12
Attending: OBSTETRICS & GYNECOLOGY
Payer: COMMERCIAL

## 2025-03-12 ENCOUNTER — ANCILLARY PROCEDURE (OUTPATIENT)
Dept: ULTRASOUND IMAGING | Facility: CLINIC | Age: 65
End: 2025-03-12
Attending: OBSTETRICS & GYNECOLOGY
Payer: COMMERCIAL

## 2025-03-12 VITALS
WEIGHT: 185 LBS | HEIGHT: 66 IN | BODY MASS INDEX: 29.73 KG/M2 | SYSTOLIC BLOOD PRESSURE: 122 MMHG | DIASTOLIC BLOOD PRESSURE: 78 MMHG

## 2025-03-12 DIAGNOSIS — N95.0 PMB (POSTMENOPAUSAL BLEEDING): ICD-10-CM

## 2025-03-12 DIAGNOSIS — N95.8 GENITOURINARY SYNDROME OF MENOPAUSE: ICD-10-CM

## 2025-03-12 DIAGNOSIS — N95.0 PMB (POSTMENOPAUSAL BLEEDING): Primary | ICD-10-CM

## 2025-03-12 PROCEDURE — 3078F DIAST BP <80 MM HG: CPT | Performed by: OBSTETRICS & GYNECOLOGY

## 2025-03-12 PROCEDURE — 99214 OFFICE O/P EST MOD 30 MIN: CPT | Performed by: OBSTETRICS & GYNECOLOGY

## 2025-03-12 PROCEDURE — 76830 TRANSVAGINAL US NON-OB: CPT | Performed by: OBSTETRICS & GYNECOLOGY

## 2025-03-12 PROCEDURE — 3074F SYST BP LT 130 MM HG: CPT | Performed by: OBSTETRICS & GYNECOLOGY

## 2025-03-12 RX ORDER — ESTRADIOL 0.1 MG/G
2 CREAM VAGINAL
Qty: 42.5 G | Refills: 4 | Status: SHIPPED | OUTPATIENT
Start: 2025-03-13

## 2025-03-12 NOTE — PROGRESS NOTES
SUBJECTIVE:                                                   Nany Pascual is a 65 year old female who presents to clinic today for the following health issue(s):  Patient presents with:  Ultrasound      Nany last saw me 25 for breast/pelvic exam and reported PMB.   Hx of PMB . Ultrasound in May showed ES 2.2mm with fluid in endo. EMB in may 2023 negative.     History of Present Illness-  Nany Pascual, 65 years, female    Recurrent Vaginal Bleeding  She reports that the bleeding is not consistent, occurring about once a week, and it is not an everyday issue.    Vaginal Discomfort  She is wondering about vaginal estrogen previously discussed        Patient's last menstrual period was 2016..     Patient is sexually active, .  Using menopause for contraception.    reports that she has never smoked. She has never used smokeless tobacco.    STD testing offered?  Declined    Health maintenance updated:  yes    Today's PHQ-2 Score:       2025     8:33 AM   PHQ-2 (  [employer])   Q1: Little interest or pleasure in doing things 0   Q2: Feeling down, depressed or hopeless 0   PHQ-2 Score 0     Today's PHQ-9 Score:       2025     8:33 AM   PHQ-9 SCORE   PHQ-9 Total Score 0     Today's TOREY-7 Score:       2025     8:33 AM   TOREY-7 SCORE   Total Score 0       Problem list and histories reviewed & adjusted, as indicated.  Additional history: as documented.    Patient Active Problem List   Diagnosis    Encounter for breast and pelvic examination     Past Surgical History:   Procedure Laterality Date    HERNIA REPAIR        Social History     Tobacco Use    Smoking status: Never    Smokeless tobacco: Never   Substance Use Topics    Alcohol use: Yes     Alcohol/week: 0.0 standard drinks of alcohol      Problem (# of Occurrences) Relation (Name,Age of Onset)    Diabetes (1) Brother    Hyperlipidemia (2) Mother, Father    Coronary Artery Disease (1) Father    Fractures (1) Mother: hip, 70s     "No Known Problems (5) Sister, Maternal Grandmother, Maternal Grandfather, Paternal Grandmother, Other              Current Outpatient Medications   Medication Sig Dispense Refill    [START ON 3/13/2025] estradiol (ESTRACE) 0.1 MG/GM vaginal cream Place 2 g vaginally twice a week. 42.5 g 4    atorvastatin (LIPITOR) 10 MG tablet  (Patient not taking: Reported on 2/27/2025)      BREO ELLIPTA 100-25 MCG/INH oral inhaler INHALE 1 PUFF D. RINSE MOUTH WELL  0    CINNAMON PO       Cyanocobalamin (VITAMIN B 12 PO)       fluticasone (FLONASE) 50 MCG/ACT nasal spray Spray 2 sprays into both nostrils daily 1 Bottle     vitamin D3 (CHOLECALCIFEROL) 61065 UNITS capsule TK 1 C PO Q OTHER WEEK  0     No current facility-administered medications for this visit.     Allergies   Allergen Reactions    Dust Mite Extract Unknown       ROS:  12 point review of systems negative other than symptoms noted below or in the HPI.  No urinary frequency or dysuria, bladder or kidney problems      OBJECTIVE:     /78   Ht 1.676 m (5' 6\")   Wt 83.9 kg (185 lb)   LMP 06/23/2016   BMI 29.86 kg/m    Body mass index is 29.86 kg/m .    Exam:  Constitutional:  Appearance: Well nourished, well developed alert, in no acute distress  Psychiatric:  Mentation appears normal and affect normal/bright.     In-Clinic Test Results:  Results for orders placed or performed in visit on 03/12/25 (from the past 24 hours)   US Transvaginal Pelvic Non-OB    Narrative    Gynecological Ultrasound Report  Pelvic U/S - Transvaginal  [facility]      Referring Provider: Dr. Dominguez Masters  Sonographer:  Beth James, Registered Diagnostic Medical Sonographer,   RDMS  Indication: Bleeding/Menses- Postmenopausal bleeding  LMP: Patient's last menstrual period was 06/23/2016.    Gynecological Ultrasonography:   Uterus: anteverted. Contour is smooth/regular.  Size: 5.44 x 3.26 x 2.65 cm  Endometrium: Thickness Total 2.18 mm  Findings: Normal  Right " Ovary: Not seen   Left Ovary: 1.31 x 1.20 x .91 cm. Wnl  Cul de Sac Free Fluid: No free fluid  Technique: Transvaginal Imaging performed     Impression:   Normal uterus with age appropriate endometrium (<4 mm).   Right ovary not seen. Left adnexa normal. No free fluid.    Angelica Rodriguez, DO       ASSESSMENT/PLAN:                                                        ICD-10-CM    1. PMB (postmenopausal bleeding)  N95.0       2. Genitourinary syndrome of menopause  N95.8 estradiol (ESTRACE) 0.1 MG/GM vaginal cream              Assessment & Plan  PMB (postmenopausal bleeding):  - Recurrent postmenopausal bleeding despite thin endometrial lining. Previous endometrial biopsy in 2023 was benign, On US ES 2.2mm containing fluid.  Reviewed rational for further evaluation, concern for abnormal tissue not sampled by biopsy, interval development of precancerous/cancerous tissue, other pathology such as polyp  Also reviewed possible vaginal trophy contributing to spotting.  - Recommend hysteroscopy, D&C  to examine the endometrial lining and obtain a comprehensive tissue sample. Reviewed hospital steps, expectations and procedure.  -Prescribe estradiol vaginal cream to be used twice per week to improve vaginal tissue condition before the procedure.  Surgery scheduler will contact the patient to arrange the procedure.  - Risks and side effects: Discussed potential for watery discharge and light cramping post-hysteroscopy. Mentioned possible irritation from estradiol cream and cost considerations.    - Labs/Imaging: Ultrasound.  - Medications: estradiol vaginal cream.    Patient was informed of AI scribe and agreed to its use.      Angelica Sanchezs, DO  [facility]

## 2025-03-18 ENCOUNTER — TELEPHONE (OUTPATIENT)
Dept: OBGYN | Facility: CLINIC | Age: 65
End: 2025-03-18

## 2025-03-18 NOTE — TELEPHONE ENCOUNTER
ERAS BAG GIVEN No  ERAS INSTRUCTIONS EXPLAINED No    ASSIST: (MD, PA, CNM, NONE) NA    H&P TO BE COMPLETED BY:   PCP  FOR H&P TO BE DONE BY SURGEON     SAME DAY/OBSERVATION/INPATIENT: STRAIGHT SAME DAY  EQUIPMENT: MYOSURE LIGHT AND REACH AVAILABLE NOT OPEN  VENDOR NEEDED AT CASE: YES  IF IUD WHAT BRAND NA  LABS/SPECIAL INSTRUCTIONS: CBC  TIME OFF WORK: 1-2D  DO YOU ESTIMATE EXTRA TIME NEEDED THAN YOUR AVERAGE ON THIS CASE? NO  IF YES, HOW MUCH EXTRA TIME IN MINUTES? NA  POST OP TO BE SCHEDULED AT 6 WEEKS AFTER SX APPOINTMENT LENGTH IN MINUTES 15

## 2025-03-20 NOTE — TELEPHONE ENCOUNTER
Spoke to pt and she stated she wants to put this off a bit and this is not a good time for her.   Asked that I call her back in a month.  Advised I will.  Will call patient on 4/21/2025    Tara Arcos  Surgery Scheduler

## 2025-04-23 NOTE — TELEPHONE ENCOUNTER
Spoke to pt and she stated she is waiting til she meets her high deductible.  needs colonoscopy which is approx $1000 so she needs to make sure this is economical for her    Advised pt that I would wait for her to call me back when she is ready    Tara Arcos  Surgery Scheduler